# Patient Record
Sex: FEMALE | Race: WHITE | NOT HISPANIC OR LATINO | Employment: UNEMPLOYED | URBAN - METROPOLITAN AREA
[De-identification: names, ages, dates, MRNs, and addresses within clinical notes are randomized per-mention and may not be internally consistent; named-entity substitution may affect disease eponyms.]

---

## 2017-06-29 DIAGNOSIS — O34.219 HISTORY OF CESAREAN DELIVERY AFFECTING PREGNANCY: Primary | ICD-10-CM

## 2017-07-06 ENCOUNTER — OFFICE VISIT (OUTPATIENT)
Dept: MATERNAL FETAL MEDICINE | Facility: CLINIC | Age: 32
End: 2017-07-06
Attending: OBSTETRICS & GYNECOLOGY
Payer: MEDICAID

## 2017-07-06 VITALS
SYSTOLIC BLOOD PRESSURE: 130 MMHG | WEIGHT: 216.06 LBS | BODY MASS INDEX: 39.52 KG/M2 | DIASTOLIC BLOOD PRESSURE: 82 MMHG

## 2017-07-06 DIAGNOSIS — O43.91: ICD-10-CM

## 2017-07-06 DIAGNOSIS — O34.219 HISTORY OF CESAREAN DELIVERY AFFECTING PREGNANCY: ICD-10-CM

## 2017-07-06 PROCEDURE — 76801 OB US < 14 WKS SINGLE FETUS: CPT | Mod: 26,S$PBB,, | Performed by: OBSTETRICS & GYNECOLOGY

## 2017-07-06 PROCEDURE — 76801 OB US < 14 WKS SINGLE FETUS: CPT | Mod: PBBFAC | Performed by: OBSTETRICS & GYNECOLOGY

## 2017-07-06 PROCEDURE — 99213 OFFICE O/P EST LOW 20 MIN: CPT | Mod: S$PBB,25,TH, | Performed by: OBSTETRICS & GYNECOLOGY

## 2017-07-06 PROCEDURE — 99212 OFFICE O/P EST SF 10 MIN: CPT | Mod: PBBFAC | Performed by: OBSTETRICS & GYNECOLOGY

## 2017-07-06 PROCEDURE — 99999 PR PBB SHADOW E&M-EST. PATIENT-LVL II: CPT | Mod: PBBFAC,,, | Performed by: OBSTETRICS & GYNECOLOGY

## 2017-07-06 PROCEDURE — 76817 TRANSVAGINAL US OBSTETRIC: CPT | Mod: PBBFAC | Performed by: OBSTETRICS & GYNECOLOGY

## 2017-07-06 PROCEDURE — 76817 TRANSVAGINAL US OBSTETRIC: CPT | Mod: 26,S$PBB,, | Performed by: OBSTETRICS & GYNECOLOGY

## 2017-07-06 RX ORDER — PROGESTERONE 100 MG/1
100 CAPSULE ORAL 2 TIMES DAILY
Refills: 1 | Status: ON HOLD | COMMUNITY
Start: 2017-06-15 | End: 2017-10-19 | Stop reason: HOSPADM

## 2017-07-06 RX ORDER — PRENATAL WITH FERROUS FUM AND FOLIC ACID 3080; 920; 120; 400; 22; 1.84; 3; 20; 10; 1; 12; 200; 27; 25; 2 [IU]/1; [IU]/1; MG/1; [IU]/1; MG/1; MG/1; MG/1; MG/1; MG/1; MG/1; UG/1; MG/1; MG/1; MG/1; MG/1
1 TABLET ORAL DAILY
COMMUNITY
End: 2018-09-11

## 2017-07-06 NOTE — PROGRESS NOTES
Consulted by Dr. Brown for suspected C/S scar ectopic    32  BANDAR 18; 8w2d    Prenatal record, chart and OB History reviewed  Pt is on oral Prometrium 100 mg QD  Pt interviewed and examined  Ultrasound performed  No interval problems  No leaking, bleeding or discharge    OB HX   - Kipling; 7-11; C/S at term for Breech   - Gray; 8-0; RC/S; superficial wound dehiscence with Staph infection.    Impression  =========  Single viable intrauterine pregnancy consistent with LMP dating.  Embryo grossly WNL with normal cardiac activity.  The placenta appears to be implanted over the C/S scar but the uterus appears intact; I do not anticipate scar separation at this time. I did  discuss the possibility of accreta  Normal uterus, cervix and adnexae as noted above.  Management options discussed with this couple.    Recommendation  ==============  Would discontinue Prometrium  We would like to re-evaluate uterine [place] [facility]; if you agree, please ask your staff to schedule.

## 2017-09-26 DIAGNOSIS — Z36.89 ENCOUNTER FOR ULTRASOUND TO CHECK FETAL GROWTH: Primary | ICD-10-CM

## 2017-10-16 PROBLEM — L02.419 THIGH ABSCESS: Status: ACTIVE | Noted: 2017-10-16

## 2017-10-18 PROBLEM — A49.02 MRSA INFECTION: Status: ACTIVE | Noted: 2017-10-18

## 2017-10-18 PROBLEM — L02.415 ABSCESS OF RIGHT THIGH: Status: ACTIVE | Noted: 2017-10-16

## 2017-10-18 PROBLEM — O43.92 ABNORMAL PLACENTA AFFECTING MANAGEMENT OF MOTHER IN SECOND TRIMESTER: Status: ACTIVE | Noted: 2017-07-06

## 2017-10-19 PROBLEM — A49.02 MRSA INFECTION: Status: ACTIVE | Noted: 2017-10-19

## 2017-10-26 ENCOUNTER — OFFICE VISIT (OUTPATIENT)
Dept: MATERNAL FETAL MEDICINE | Facility: CLINIC | Age: 32
End: 2017-10-26
Payer: COMMERCIAL

## 2017-10-26 VITALS
SYSTOLIC BLOOD PRESSURE: 116 MMHG | DIASTOLIC BLOOD PRESSURE: 74 MMHG | BODY MASS INDEX: 40.85 KG/M2 | WEIGHT: 223.31 LBS

## 2017-10-26 DIAGNOSIS — O43.92 ABNORMAL PLACENTA AFFECTING MANAGEMENT OF MOTHER IN SECOND TRIMESTER: ICD-10-CM

## 2017-10-26 DIAGNOSIS — Z36.89 ENCOUNTER FOR ULTRASOUND TO CHECK FETAL GROWTH: ICD-10-CM

## 2017-10-26 PROCEDURE — 99999 PR PBB SHADOW E&M-EST. PATIENT-LVL II: CPT | Mod: PBBFAC,,, | Performed by: OBSTETRICS & GYNECOLOGY

## 2017-10-26 PROCEDURE — 76816 OB US FOLLOW-UP PER FETUS: CPT | Mod: S$GLB,,, | Performed by: OBSTETRICS & GYNECOLOGY

## 2017-10-26 PROCEDURE — 99214 OFFICE O/P EST MOD 30 MIN: CPT | Mod: 25,S$GLB,, | Performed by: OBSTETRICS & GYNECOLOGY

## 2017-10-26 NOTE — LETTER
October 27, 2017      Ceasar Juarez MD  [address]  [facility] [place] [address]           [facility] - Maternal Fetal Med  [address] [address]  Phone: [phone]          Patient: Parth Latif   MR Number: [id number]   YOB: 1985   Date of Visit: 10/26/2017       Dear Dr. Ceasar Juarez:    Thank you for referring Parth Latif to me for evaluation. Attached you will find relevant portions of my assessment and plan of care.    If you have questions, please do not hesitate to call me. I look forward to following Parth Latif along with you.    Sincerely,    Mike Eric MD    Enclosure  CC:  No Recipients    If you would like to receive this communication electronically, please contact [email or web address] or [phone] to request more information on [employer] Link access.    For providers and/or their staff who would like to refer a patient to [facility], please contact us through our one-stop-shop provider referral line, [facility], at [phone].    If you feel you have received this communication in error or would no longer like to receive these types of communications, please e-mail [email or web address]

## 2017-10-26 NOTE — Clinical Note
This patient is a new morbidly adherent placenta--likely a percreta. Timing will be first week of January. She is getting an MRI in mid-November

## 2017-10-27 DIAGNOSIS — O09.92 HIGH-RISK PREGNANCY IN SECOND TRIMESTER: Primary | ICD-10-CM

## 2017-10-27 NOTE — PROGRESS NOTES
"Indication  ========    Evaluation of fetal growth, Placental implantation.    History  ======    Previous Outcomes  Preg. no. 1  Outcome: Live birth  Preg. no. 2  Outcome: Live birth   3  Para 2  Gomez living children (L) 2  Risk Factors  Details: C/S x 2    Maternal Assessment  =================    Weight 101 kg  Weight (lb) 223 lb  BP syst 116 mmHg  BP diast 74 mmHg    Method  ======    [place]on E10, Transabdominal ultrasound examination. View: Good view.    Pregnancy  =========    Gomez pregnancy. Number of fetuses: 1.    Dating  ======    Cycle: regular cycle  GA by "stated dating" 24 w + 2 d  BANDAR by "stated dating": 2018  Ultrasound examination on: 10/26/2017  GA by U/S based upon: AC, BPD, Femur, HC  GA by U/S 25 w + 5 d  BANDAR by U/S: 2/3/2018  Assigned: Dating performed on 2017, based on the external assessment  Assigned GA 24 w + 2 d  Assigned BANDAR: 2018    General Evaluation  ==============    Cardiac activity: present.  bpm.  Fetal movements: visualized.  Presentation: cephalic.  Placenta: anterior, marginal previa.  Umbilical cord: 3 vessel cord.  Amniotic fluid: Amount of AF: normal amount. MVP 4.8 cm.    Fetal Biometry  ============    Fetal Biometry  BPD 64.2 mm 26w 0d Hadlock  OFD 82.5 mm 26w 6d Rosalinda  .6 mm 25w 4d Hadlock  .7 mm 26w 5d Hadlock  Femur 44.2 mm 24w 4d Hadlock   g 72% Lincoln  Calculated by: Hadlock (BPD-HC-AC-FL)  EFW (lb) 1 lb  EFW (oz) 14 oz  Cephalic index 0.78  HC / AC 1.05  FL / BPD 0.69  FL / AC 0.20  MVP 4.8 cm   bpm    Fetal Anatomy  ===========    Cranium: normal  Posterior fossa: normal  4-chamber view: normal  Stomach: normal  Kidneys: normal  Bladder: normal  Gender: male  Wants to know gender: yes  Other: A full anatomy survey previously performed.    Consultation  ==========      Prenatal record, chart and OB History reviewed    Dr. Londono's last note reviewed  Pt interviewed and examined  Ultrasound " performed  No interval problems  No leaking, bleeding or discharge  Good MACIE Alba was seen in follow-up today. She was accompanied by her  and her mother as well as her son. She denies any significant  problems with the pregnancy since her last visit. She was however recently admitted to the hospital for a thigh abscess which was due to  MRSA. She has a history of recurrent MRSA infections. Her prior  delivery was complicated by wound dehiscence with a staph  infection. She specifically denies any bleeding or hematuria. She has not had any problems with bowel movements and has not noticed any  blood in her stool.    An ultrasound evaluation was performed today. Fetal growth is normal as is the amniotic fluid volume. We focused mainly on the placenta.  There is a complete placenta previa with an anterior placenta. In the area of the old hysterotomy there appears to be a loss of the normal  interface between the placenta and the endometrium. There is an abnormal contour to the uterus in this area and there are multiple placental  lakes. In the area of the vesicouterine reflection there is marked vascularity which appears to be maternal in nature. There does not appear to  be any obvious invasion of the bladder although the posterior wall of the bladder is thin and it looks like the vessels in this area push on it. With  ultrasound I do not see any obvious parametrial involvement but it is difficult to image this area well.    I overall spent approximately 35 minutes in face-to-face time with her and her family greater than 50% of which was spent in counseling time  regarding today's ultrasound findings and in discussion of the plan of care. Based on the imaging I believe that this is a morbidly adherent  placenta and is most likely consistent with at least an increta and most likely is a percreta. We are going to obtain further imaging with a fetal  MRI to better determine the extent of invasion  through the uterine wall and to determine if there is invasion into the bladder. This will also help  us better determine if there is extensive parametrial involvement. I explained to her our usual approach to the morbidly adherent placenta  especially those involving the bladder. This includes involvement of our multidisciplinary placenta accreta teen with maternal-fetal medicine,  GYN oncology, OB anesthesia, neonatology, and interventional radiology. We also involve our colleagues from blood bank in preparation for  potential large-volume blood transfusion. I explained that we typically plan on delivery at around 34 weeks with a  delivery via a vertical  midline incision with the placenta left in situ. Assuming that there is no significant bleeding from the placental bed or uterus, we then proceed  with uterine artery embolization and plan for interval hysterectomy after there is been a decrease in vascularity. We would monitor labs as an  outpatient to look for any evidence of coagulopathy and we would also need to be mindful of the potential increase in the risk of infection.    We are going to get the imaging and follow-up consults scheduled for approximately 3-4 weeks and we'll try to coordinate these on the same  day. I will plan to see her back at that time as well. I reviewed bleeding precautions with Parth and explained that if she has any hematuria or  bleeding she should seek prompt evaluation.    Impression  =========    Normal fetal growth at the 72nd percentile  Normal amniotic fluid volume  Complete placenta previa with morbidly adherent placenta likely a percreta into the uterine serosa.  Marked vascularity in the area of vesicouterine reflection that represents maternal vascular plexus.    Recommendation  ==============    1. Pelvic MRI to be obtained to better define depth of invasion and extent of parametrial involvement  2. Consultation to be arranged with accreta team: Dr. Bowens from  Gyn Oncology, OB anesthesia, and Interventional radiology  3. Bleeding precautions reviewed but in absence of indication for earlier delivery, will plan delivery at 34-35 weeks ( 2-8) in main OR at  [facility] with plan for classical , followed by uterine artery embolization and interval hysterectomy.    Thank you again for allowing us to participate in the care of your patients. If you have any questions concerning today's consultation, feel free  to contact me or one of my partners. We can be reached at [phone] during normal business hours. If you have a question after normal  business hours, please contact Labor and Delivery at [phone].

## 2017-10-30 ENCOUNTER — TELEPHONE (OUTPATIENT)
Dept: MATERNAL FETAL MEDICINE | Facility: CLINIC | Age: 32
End: 2017-10-30

## 2017-10-30 DIAGNOSIS — Z36.89 ENCOUNTER FOR ULTRASOUND TO CHECK FETAL GROWTH: Primary | ICD-10-CM

## 2017-10-30 DIAGNOSIS — O43.93 ABNORMAL PLACENTA AFFECTING MANAGEMENT OF MOTHER IN THIRD TRIMESTER: ICD-10-CM

## 2017-11-20 ENCOUNTER — OFFICE VISIT (OUTPATIENT)
Dept: GYNECOLOGIC ONCOLOGY | Facility: CLINIC | Age: 32
End: 2017-11-20
Payer: COMMERCIAL

## 2017-11-20 ENCOUNTER — ANESTHESIA EVENT (OUTPATIENT)
Dept: ANESTHESIOLOGY | Facility: OTHER | Age: 32
End: 2017-11-20
Payer: COMMERCIAL

## 2017-11-20 ENCOUNTER — OFFICE VISIT (OUTPATIENT)
Dept: MATERNAL FETAL MEDICINE | Facility: CLINIC | Age: 32
End: 2017-11-20
Payer: COMMERCIAL

## 2017-11-20 ENCOUNTER — ANESTHESIA (OUTPATIENT)
Dept: ANESTHESIOLOGY | Facility: OTHER | Age: 32
End: 2017-11-20
Payer: COMMERCIAL

## 2017-11-20 ENCOUNTER — OFFICE VISIT (OUTPATIENT)
Dept: ANESTHESIOLOGY | Facility: OTHER | Age: 32
End: 2017-11-20
Attending: OBSTETRICS & GYNECOLOGY
Payer: COMMERCIAL

## 2017-11-20 ENCOUNTER — HOSPITAL ENCOUNTER (OUTPATIENT)
Dept: RADIOLOGY | Facility: OTHER | Age: 32
Discharge: HOME | End: 2017-11-20
Attending: OBSTETRICS & GYNECOLOGY
Payer: COMMERCIAL

## 2017-11-20 VITALS
WEIGHT: 223 LBS | DIASTOLIC BLOOD PRESSURE: 61 MMHG | BODY MASS INDEX: 40.79 KG/M2 | HEART RATE: 95 BPM | SYSTOLIC BLOOD PRESSURE: 126 MMHG

## 2017-11-20 VITALS
BODY MASS INDEX: 40.81 KG/M2 | WEIGHT: 223.13 LBS | DIASTOLIC BLOOD PRESSURE: 61 MMHG | SYSTOLIC BLOOD PRESSURE: 126 MMHG

## 2017-11-20 DIAGNOSIS — O43.93 ABNORMAL PLACENTA AFFECTING MANAGEMENT OF MOTHER IN THIRD TRIMESTER: ICD-10-CM

## 2017-11-20 DIAGNOSIS — Z36.89 ENCOUNTER FOR ULTRASOUND TO CHECK FETAL GROWTH: ICD-10-CM

## 2017-11-20 DIAGNOSIS — O44.03 PLACENTA PREVIA ANTEPARTUM IN THIRD TRIMESTER: Primary | ICD-10-CM

## 2017-11-20 DIAGNOSIS — O43.212 PLACENTA ACCRETA IN SECOND TRIMESTER: ICD-10-CM

## 2017-11-20 DIAGNOSIS — O43.92 ABNORMAL PLACENTA AFFECTING MANAGEMENT OF MOTHER IN SECOND TRIMESTER: Primary | ICD-10-CM

## 2017-11-20 DIAGNOSIS — O43.213 PLACENTA ACCRETA, THIRD TRIMESTER: ICD-10-CM

## 2017-11-20 PROCEDURE — 72195 MRI PELVIS W/O DYE: CPT | Mod: 26,,, | Performed by: RADIOLOGY

## 2017-11-20 PROCEDURE — 99204 OFFICE O/P NEW MOD 45 MIN: CPT | Mod: ICN,,, | Performed by: ANESTHESIOLOGY

## 2017-11-20 PROCEDURE — 99245 OFF/OP CONSLTJ NEW/EST HI 55: CPT | Mod: S$GLB,,, | Performed by: OBSTETRICS & GYNECOLOGY

## 2017-11-20 PROCEDURE — 99999 PR PBB SHADOW E&M-EST. PATIENT-LVL II: CPT | Mod: PBBFAC,,, | Performed by: OBSTETRICS & GYNECOLOGY

## 2017-11-20 PROCEDURE — 99214 OFFICE O/P EST MOD 30 MIN: CPT | Mod: 25,S$GLB,, | Performed by: OBSTETRICS & GYNECOLOGY

## 2017-11-20 PROCEDURE — 76816 OB US FOLLOW-UP PER FETUS: CPT | Mod: S$GLB,,, | Performed by: OBSTETRICS & GYNECOLOGY

## 2017-11-20 PROCEDURE — 72195 MRI PELVIS W/O DYE: CPT | Mod: TC

## 2017-11-20 NOTE — CONSULTS
Consults   [facility]    Date:    2017  12:40 PM     Anesthesia Consult: outpatient    Initial Consultation: Yes    Requested by: Dr. Eric    Diagnosis:  Placenta Previa and Percreta    Reason for Consult: Anesthetic recommendations for delivery    Allergies:  Pcn [penicillins]    History of Present Illness:    Patient is a 32 year old,  female, . with history of 2 prior  deliveries diagnosed with placenta previa and percreta.  Patient also has a history of mandibular surgery for retrognathia.       Past medical history:    Past Medical History:   Diagnosis Date    MRSA (methicillin resistant staph aureus) culture positive        Past surgical history:    Past Surgical History:   Procedure Laterality Date     SECTION      x2     MANDIBLE SURGERY      TONSILLECTOMY         Family history:    Family History   Problem Relation Age of Onset    Hypertension Mother     No Known Problems Father        Social History:    Social History     Social History    Marital status:      Spouse name: N/A    Number of children: N/A    Years of education: N/A     Occupational History    Not on file.     Social History Main Topics    Smoking status: Never Smoker    Smokeless tobacco: Never Used    Alcohol use No    Drug use: No    Sexual activity: Yes     Partners: Male     Birth control/ protection: IUD     Other Topics Concern    Not on file     Social History Narrative    No narrative on file     Medication:    Current Outpatient Prescriptions on File Prior to Visit   Medication Sig Dispense Refill    butalbital-acetaminophen-caff -40 mg Cap       clindamycin (CLEOCIN) 300 MG capsule Take 1 capsule (300 mg total) by mouth every 6 (six) hours. 21 capsule 0    PNV,CALCIUM 72/IRON/FOLIC ACID (PRENATAL VITAMIN) Tab Take 1 tablet by mouth once daily.       No current facility-administered medications on file prior to visit.          Past anesthesia  history:    Hx of general anesthesia problems: No  No anesthesia complications after general anesthesia.   Nausea with spinal anesthetics for  deliveries    Diagnostic Studies    I have reviewed the following. Relevant findings as noted:    Blood group: B POS   CBC:   Lab Results   Component Value Date    WBC 15.60 (H) 10/16/2017    RBC 3.86 (L) 10/16/2017    HGB 11.1 (L) 10/16/2017    HCT 34.2 (L) 10/16/2017     10/16/2017     BMP:   Lab Results   Component Value Date     (H) 10/16/2017     10/16/2017    K 3.9 10/16/2017     10/16/2017    CO2 24 10/16/2017    BUN 7 10/16/2017    CREATININE 0.60 (L) 10/19/2017    CALCIUM 9.2 10/16/2017     MRI Results pending    Review of Systems     Constitution: no weight loss, fever, night sweats  Eyes:  no recent visual changes  ENT:  None  Respiratory:  Shortness of Breath with activity (as expected with pregnancy)  Cardiovascular: negative chest pain  Hematology: negative for easy bleeding, bruising  Gastrointestinal:  Negative for abdominal pain  Genitourinary:  Negative for blood in urine  Musculoskeletal:  Negative for musculoskeletal pain  Neurologic:  None  Psych:No depression  Endocrine:  None      Physical Examination:     General appearance: healthy, no distress, cooperativewell developed, well nourished female  Eye: PERRL, EOM normal  ENT: ENT exam normal, no neck nodes or sinus tenderness and airway not compromised  Pulm: no accessory muscle use  Cardiac: regular rate and rhythm  Neuro: normal without focal findings, mental status, speech normal, alert and oriented x3, ELLIOT and reflexes normal and symmetric  Musculoskeletal: no joint tenderness, deformity or swelling, no muscular tenderness noted, full range of motion without pain  Skin: negative for - jaundice, spider hemangioma, telangiectasia, palmar erythema, ecchymosis and atrophy  Lymphatic: No abnormally enlarged lymph nodes.  Psych: oriented to time, place and  person  Airway: negative I (soft palate, uvula, fauces, and tonsillar pillars visible) adequate mouth opening      Problem Assessment    ASA 3 - Patient with moderate systemic disease with functional limitations    History of present disease is positive for:  Placenta previa and percreta.      Plans & Recommendations    Discussed possibility of neuraxial and general anesthetic for delivery.  Neuraxial vs GETA will be determined after reviewing imaging and discussion with MFM and gyn/onc. Do not anticipate difficulty with airway for GETA after mandibular surgery (adequate mouth opening, MPI)  Will provide anxiolytic if neuraxial anesthesia provided.  Once adequately anesthetized, large bore IV Access will be obtained.  Patient has a history of difficult IV stick after requiring multiple IVs for vancomycin for recent MRSA treatment.  Discussed both peripheral and central IV access. Will plan for arterial line for close hemodynamic monitoring and laboratory testing intraoperatively.  Depending upon anesthetic type, extent of case, requirement for blood products, patient may require ICU admission.    Entertained and answered all question to the patient's and family's satisfaction.       Dafne Joseph MD

## 2017-11-20 NOTE — LETTER
November 21, 2017      Leslee Brown MD  [address] [address]           [facility] - Maternal Fetal Med  [address] [address]  Phone: [phone]          Patient: Parth Latif   MR Number: [id number]   YOB: 1985   Date of Visit: 11/20/2017       Dear Dr. Leslee Brown:    Thank you for referring Parth Latif to me for evaluation. Attached you will find relevant portions of my assessment and plan of care.    If you have questions, please do not hesitate to call me. I look forward to following Parth Latif along with you.    Sincerely,    Mike Eric MD    Enclosure  CC:  No Recipients    If you would like to receive this communication electronically, please contact [email or web address] or [phone] to request more information on [email or web address] Link access.    For providers and/or their staff who would like to refer a patient to [facility], please contact us through our one-stop-shop provider referral line, [facility], at [phone].    If you feel you have received this communication in error or would no longer like to receive these types of communications, please e-mail [email or web address]

## 2017-11-20 NOTE — LETTER
November 20, 2017      Mike Eric MD  [address]  [address]th [address]  [place] [address]           [facility] - Gynecologic Oncology  [address], [address]  [place] [address]  Phone: [phone]  Fax: [phone]          Patient: Parth Latif   MR Number: [id number]   YOB: 1985   Date of Visit: 11/20/2017       Dear Dr. Mike Eric:    Thank you for referring Parth Latif to me for evaluation. Attached you will find relevant portions of my assessment and plan of care.    If you have questions, please do not hesitate to call me. I look forward to following Parth Latif along with you.    Sincerely,    Lamont Bowens MD    Enclosure  CC:  No Recipients    If you would like to receive this communication electronically, please contact [email or web address] or [phone] to request more information on [employer] Link access.    For providers and/or their staff who would like to refer a patient to [facility], please contact us through our one-stop-shop provider referral line, [facility], at [phone].    If you feel you have received this communication in error or would no longer like to receive these types of communications, please e-mail [email or web address]

## 2017-11-20 NOTE — PROGRESS NOTES
"Subjective:      Patient ID: Parth Latif is a 32 y.o. female.    Chief Complaint: Percreta (Referred by Dr. Eric)      HPI     Ms. Latif is a 32yr old para 2 who is currently pregnant at 27w 6d (BANDAR 2/13/2018) referred by Dr. Eric due to placenta percreta. Pt reports two prior c section (LTV, '10 and '13). MRSA wound dehiscence with last c section.    MFM 10/26/2017  "complete placenta previa with an anterior placenta. In the area of the old hysterotomy there appears to be a loss of the normal  interface between the placenta and the endometrium. There is an abnormal contour to the uterus in this area and there are multiple placental  lakes. In the area of the vesicouterine reflection there is marked vascularity which appears to be maternal in nature. There does not appear to  be any obvious invasion of the bladder although the posterior wall of the bladder is thin and it looks like the vessels in this area push on it. With  ultrasound I do not see any obvious parametrial involvement but it is difficult to image this area well"    MRI today 11/20/2017 results pending.  She is also scheduled to meet with anesthesia.  She is here today to establish care and to outline our surgical plan.    Review of Systems   Constitutional: Negative for chills, diaphoresis, fatigue and fever.   Respiratory: Negative for chest tightness, shortness of breath and wheezing.    Cardiovascular: Negative for chest pain, palpitations and leg swelling.   Gastrointestinal: Negative for abdominal pain, constipation, diarrhea, nausea and vomiting.   Genitourinary: Negative for difficulty urinating, dyspareunia, dysuria, flank pain, frequency, genital sores, hematuria, pelvic pain, urgency, vaginal bleeding, vaginal discharge and vaginal pain.   Skin: Negative for color change.   Neurological: Negative for dizziness, light-headedness and headaches.   Psychiatric/Behavioral: Negative for agitation.       Past Medical History: "   Diagnosis Date    MRSA (methicillin resistant staph aureus) culture positive      Past Surgical History:   Procedure Laterality Date     SECTION      x2     MANDIBLE SURGERY      TONSILLECTOMY       Family History   Problem Relation Age of Onset    Hypertension Mother     No Known Problems Father      Social History     Social History    Marital status:      Spouse name: N/A    Number of children: N/A    Years of education: N/A     Occupational History    Not on file.     Social History Main Topics    Smoking status: Never Smoker    Smokeless tobacco: Never Used    Alcohol use No    Drug use: No    Sexual activity: Yes     Partners: Male     Birth control/ protection: IUD     Other Topics Concern    Not on file     Social History Narrative    No narrative on file     Current Outpatient Prescriptions   Medication Sig    butalbital-acetaminophen-caff -40 mg Cap     clindamycin (CLEOCIN) 300 MG capsule Take 1 capsule (300 mg total) by mouth every 6 (six) hours.    PNV,CALCIUM 72/IRON/FOLIC ACID (PRENATAL VITAMIN) Tab Take 1 tablet by mouth once daily.     No current facility-administered medications for this visit.      Review of patient's allergies indicates:   Allergen Reactions    Pcn [penicillins] Hives       Objective:   Physical Exam:   Constitutional: She is oriented to person, place, and time. She appears well-developed and well-nourished. No distress.    HENT:   Head: Normocephalic and atraumatic.     Neck: Normal range of motion.    Cardiovascular: Exam reveals no cyanosis and no edema.     Pulmonary/Chest: Effort normal. No respiratory distress. She exhibits no tenderness.        Abdominal: She exhibits mass (gravid uterus extending above umbilicus). She exhibits no fluid wave and no ascites.             Musculoskeletal: Normal range of motion and moves all extremeties.       Neurological: She is alert and oriented to person, place, and time.    Skin: Skin is warm  and dry. She is not diaphoretic. No cyanosis.    Psychiatric: She has a normal mood and affect.       Assessment:     1. Abnormal placenta affecting management of mother in second trimester        Plan:       Will f/u results of MRI.  I am ok to operate on her when [facility] deems appropriate, which would be 1st week of January if at 34 weeks.  We spent time today outlining the challenges associated with abnormal placentation like hers, and the risks of bleeding and other surgical morbidity related to hysterectomy for her diagnosis.  She is aware that we will attempt to keep the placenta in situ with immediate post-op embolization, and then perform interval hysterectomy 4 weeks later.  She was also counseled on the possibility of urologic resection and reconstruction, transfusion, ICU stay, and prolonged viera catheter drainage of her bladder.  All questions were answered.  I will have her return to see me closer to deliver so she can sign consents and we can finalize her treatment plan.

## 2017-11-21 NOTE — PROGRESS NOTES
"Indication  ========    Follow-up evaluation for fetal growth \, placenta previa ? accreta.    History  ======    General History  Other: Paulina Calderon  Previous Outcomes  Preg. no. 1  Outcome: Live birth  Preg. no. 2  Outcome: Live birth   3  Para 2  Gomez living children (L) 2  Risk Factors  Details: C/S x 2    Method  ======    Transabdominal ultrasound examination. View: Sufficient.    Pregnancy  =========    Gomez pregnancy. Number of fetuses: 1.    Dating  ======    Cycle: regular cycle  GA by "stated dating" 27 w + 6 d  BANDAR by "stated dating": 2018  Ultrasound examination on: 2017  GA by U/S based upon: AC, BPD, Femur, HC  GA by U/S 29 w + 4 d  BANDAR by U/S: 2018  Assigned: Dating performed on 2017, based on the external assessment  Assigned GA 27 w + 6 d  Assigned BANDAR: 2018    General Evaluation  ==============    Cardiac activity: present.  bpm.  Fetal movements: visualized.  Presentation: transverse/cephalic.  Placenta:  Placental site: complete previa.  Umbilical cord: Cord vessels: 3 vessel cord.  Amniotic fluid: MVP 6.1 cm. SHE 17.2 cm. Q1 4.6 cm, Q2 6.1 cm, Q3 4.6 cm, Q4 2.0 cm.  There continue to be large placental lakes with irregular interface between the placenta and uterus suggestive of placenta accreta/increta. No  obvious bladder involvement.    Fetal Biometry  ============    Fetal Biometry  BPD 75.1 mm 30w 1d Hadlock  OFD 94.2 mm 30w 3d Rosalinda  .6 mm 30w 0d Hadlock  .7 mm 30w 4d Hadlock  Femur 51.5 mm 27w 4d Hadlock  EFW 1,409 g 66% Lincoln  Calculated by: Hadlock (BPD-HC-AC-FL)  EFW (lb) 3 lb  EFW (oz) 2 oz  Cephalic index 0.80  HC / AC 1.04  FL / BPD 0.69  FL / AC 0.19  MVP 6.1 cm  SHE 17.2 cm   bpm  Head / Face / Neck   6.3 mm    Fetal Anatomy  ============    Cranium: normal  Lateral ventricles: normal  Cavum septi pellucidi: normal  Cerebellum: normal  Cisterna " magna: normal  Lips: normal  Profile: normal  Nose: normal  4-chamber view: normal  Stomach: normal  Kidneys: normal  Bladder: normal  Genitals: normal  Gender: male  Wants to know gender: yes    Consultation  ==========    Parth was seen in follow-up today.  Prior notes reviewed.  She denies any significant interval problems other than the fact that she recently failed her 1 hour glucose screening. She has not had any  bleeding, cramping, or contractions. She has not noted any blood in her urine. She reports good fetal movement.    She had an MRI exam this morning. The final report of the MRI is pending at this time. My informal review of the MRI does not show any clear  area of bladder invasion although there is marked vascularity in the area. I do not see any obvious parametrial extension at this point on the  imaging either.    She met with Dr. Bowens in GYN oncology as well as Dr. Joseph from OB anesthesia this morning.    Fetal growth is at the 66th percentile. The amniotic fluid volume is normal. We continue to see that the placenta is irregular in appearance with  a loss of the normal interface at the inferior portion in the area of the old hysterotomy. Multiple placental lakes are noted as well.    I discussed with her and her mother again the plan of care and explained that we will plan on delivery at approximately 34 weeks of gestation  which will be the first week of January. We will want her to have a course of  corticosteroids prior to that and this can likely be  arranged as an outpatient. We again addressed the surgical approach with the hope to leave the placenta in situ followed by embolization with  delayed hysterectomy at 4 weeks to 6 weeks postpartum. We discussed some of the risks of surgery including bleeding, infection, damage to  surrounding tissues, the potential need for bladder dissection and resection, transfusion, and ICU stay.    I explained that she will need to come in a  couple of days prior to surgery to have labs drawn and at that point we will also arrange for her to  meet with our interventional radiology team so that they can discuss the embolization procedure with her. Furthermore given her history of  MRSA we will need to alter our typical antibiotic prophylaxis.    I overall spent approximately 30 minutes in face-to-face time with her and her mother greater than 50% of which were spent in counseling time  and further care coordination. I reviewed precautions with Parth again and we have scheduled a follow-up appointment for approximately 3-4  weeks from now and at that time we will make final plans regarding delivery.    Impression  =========    Fetal size is AGA with the EFW at the 66th percentile.  Normal repeat limited fetal anatomic survey. AFV is normal.  Complete placenta previa with multiple vascular lakes and abnormal interface with myometrium highly suggestive of placenta accreta; no  obvious bladder invasion.    Recommendation  ==============    We recommend repeat evaluation for fetal growth assessment in 3-4 weeks.  Delivery planned for 34 weeks with  corticosteroids the week prior. If any problems arise in the interim in terms of labor, bleeding,  hematuria, may need earlier delivery.    Thank you again for allowing us to participate in the care of your patients. If you have any questions concerning today's consultation, feel free  to contact me or one of my partners. We can be reached at [phone] during normal business hours. If you have a question after normal  business hours, please contact Labor and Delivery at [phone].

## 2017-12-22 ENCOUNTER — OFFICE VISIT (OUTPATIENT)
Dept: MATERNAL FETAL MEDICINE | Facility: CLINIC | Age: 32
End: 2017-12-22
Payer: COMMERCIAL

## 2017-12-22 VITALS
SYSTOLIC BLOOD PRESSURE: 114 MMHG | BODY MASS INDEX: 42.66 KG/M2 | WEIGHT: 233.25 LBS | DIASTOLIC BLOOD PRESSURE: 68 MMHG

## 2017-12-22 DIAGNOSIS — Z36.89 ENCOUNTER FOR ULTRASOUND TO CHECK FETAL GROWTH: ICD-10-CM

## 2017-12-22 DIAGNOSIS — O43.213 PLACENTA ACCRETA, THIRD TRIMESTER: Primary | ICD-10-CM

## 2017-12-22 PROCEDURE — 99214 OFFICE O/P EST MOD 30 MIN: CPT | Mod: 25,S$GLB,, | Performed by: OBSTETRICS & GYNECOLOGY

## 2017-12-22 PROCEDURE — 76819 FETAL BIOPHYS PROFIL W/O NST: CPT | Mod: S$GLB,,, | Performed by: OBSTETRICS & GYNECOLOGY

## 2017-12-22 PROCEDURE — 76816 OB US FOLLOW-UP PER FETUS: CPT | Mod: S$GLB,,, | Performed by: OBSTETRICS & GYNECOLOGY

## 2017-12-22 PROCEDURE — 99999 PR PBB SHADOW E&M-EST. PATIENT-LVL II: CPT | Mod: PBBFAC,,, | Performed by: OBSTETRICS & GYNECOLOGY

## 2017-12-22 NOTE — LETTER
December 22, 2017      Leslee Brown MD  [address] [address]           [facility]  [address] [address]  Phone: [phone]          Patient: Parth Latif   MR Number: [id number]   YOB: 1985   Date of Visit: 12/22/2017       Dear Dr. Leslee Brown:    Thank you for referring Parth Latif to me for evaluation. Attached you will find relevant portions of my assessment and plan of care.    If you have questions, please do not hesitate to call me. I look forward to following Parth Latif along with you.    Sincerely,    Mike Eric MD    Enclosure  CC:  No Recipients    If you would like to receive this communication electronically, please contact [email or web address] or [phone] to request more information on [employer] access.    For providers and/or their staff who would like to refer a patient to [facility], please contact us through our one-stop-shop provider referral line, [facility], at [phone].    If you feel you have received this communication in error or would no longer like to receive these types of communications, please e-mail [email or web address]

## 2017-12-22 NOTE — PROGRESS NOTES
"Indication  ========    r/t md: Evaluation of fetal growth/ complete previa/ accreta.    History  ======    General History  Other: Paulina Calderon  Previous Outcomes  Preg. no. 1  Outcome: Live birth  Details:   Preg. no. 2  Outcome: Live birth  Details:    3  Para 2  Gomez living children (L) 2  Risk Factors  Details: C/S x 2    Maternal Assessment  =================    Weight 106 kg  Weight (lb) 234 lb  BP syst 114 mmHg  BP diast 68 mmHg    Method  ======    Transabdominal ultrasound examination. View: Sufficient.    Pregnancy  =========    Gomez pregnancy. Number of fetuses: 1.    Dating  ======    Cycle: regular cycle  GA by "stated dating" 32 w + 3 d  BANDAR by "stated dating": 2018  Ultrasound examination on: 2017  GA by U/S based upon: AC, BPD, Femur, HC  GA by U/S 35 w + 5 d  BANDAR by U/S: 2018  Assigned: Dating performed on 2017, based on the external assessment  Assigned GA 32 w + 3 d  Assigned BANDAR: 2018    General Evaluation  ==============    Cardiac activity: present.  bpm.  Fetal movements: visualized.  Presentation: transverse.  Placenta: Complete previa.  Umbilical cord: 3 vessel cord.  Amniotic fluid: Amount of AF: normal amount. MVP 9.7 cm. SHE 18.5 cm. Q1 8.5 cm, Q2 3.2 cm, Q3 4.3 cm, Q4 2.5 cm.    Fetal Biometry  ============    Fetal Biometry  BPD 91.3 mm 37w 0d Hadlock  .0 mm 38w 4d Rosalinda  .2 mm 37w 0d Hadlock  .6 mm 37w 3d Hadlock  Femur 60.6 mm 31w 4d Hadlock  EFW 2,803 g 74% Lincoln  Calculated by: Hadlock (BPD-HC-AC-FL)  EFW (lb) 6 lb  EFW (oz) 3 oz  Cephalic index 0.81  HC / AC 0.97  FL / BPD 0.66  FL / AC 0.18  MVP 9.7 cm  SHE 18.5 cm   bpm  Head / Face / Neck   8.1 mm    Fetal Anatomy  ===========    Cranium: normal  Lateral ventricles: normal  Posterior fossa: normal  Stomach: normal  Kidneys: normal  Bladder: normal  Gender: male  Wants to know gender: yes  Other: A full anatomy survey " "previously performed.    Consultation  ==========    Parth was seen in follow-up today.    She denies any significant interval problems. She has not had any bleeding, cramping, or contractions. She has not noted any blood in her  urine. She reports good fetal movement.    She had an MRI last month that did not show definite bladder invasion although there is marked vascularity in the area and a high degree of  suspicion. There was no parametrial extension.  MRI read was: "Complete placenta previa with suggestion of pathologic placental bands along the right anterolateral placental margin raising  concern for placenta accreta. Thinning and irregularity of the adjacent superior bladder wall is concerning for possible percreta with bladder wall  invasion."    Fetal growth is at the 74th percentile. The amniotic fluid volume is normal. We continue to see that the placenta is irregular in appearance  with  a loss of the normal interface at the inferior portion in the area of the old hysterotomy. Multiple placental lakes are noted as well. There is no  obvious bladder invasion. BPP is 8/8.    We are planning on delivery at approximately 34 weeks of gestation in the first week of January. We will want her to have a course of   corticosteroids prior to that and will get this arranged as an outpatient. We again addressed the surgical approach with the hope to leave the  placenta in situ followed by embolization with  delayed hysterectomy at 4 weeks to 6 weeks postpartum. We discussed some of the risks of surgery including bleeding, infection, damage  to  surrounding tissues, the potential need for bladder dissection and resection, transfusion, and ICU stay. We also discussed that we typically  discourage breastfeeding to avoid oxytocin release, but if she feels strongly we could allow her to try to breastfeed with a plan to discontinue it  if she has significant contractions.    I explained that she will need to " come in as an outpatient a couple of days prior to surgery to have labs drawn and at that point we will also  arrange for her to  meet with our interventional radiology team so that they can discuss the embolization procedure with her. Furthermore given her history of  MRSA we will need to alter our typical antibiotic prophylaxis and will also plan on pre-op mupiricin.    I overall spent approximately 30 minutes in face-to-face time with her and her  greater than 50% of which were spent in counseling  time  and further care coordination. I reviewed precautions with Parth again and will get in touch with her as soon as we make final plans regarding  delivery.      Impression  =========    Fetal size is AGA with the EFW at the 74th percentile.  Normal repeat limited fetal anatomic survey. AFV is normal.  Complete placenta previa with multiple vascular lakes and abnormal interface with myometrium highly suggestive of placenta accreta; no  obvious bladder invasion but percreta suspected.    Recommendation  ==============    Delivery planned for 34 weeks with  corticosteroids the week prior. If any problems arise in the interim in terms of labor, bleeding,  hematuria, please call and we can accept her in transfer.    Thank you again for allowing us to participate in the care of your patients. If you have any questions concerning today's consultation, feel free  to contact me or one of my partners. We can be reached at [phone] during normal business hours. If you have a question after normal  business hours, please contact Labor and Delivery at [phone].

## 2017-12-27 ENCOUNTER — TELEPHONE (OUTPATIENT)
Dept: MATERNAL FETAL MEDICINE | Facility: CLINIC | Age: 32
End: 2017-12-27

## 2017-12-27 ENCOUNTER — TELEPHONE (OUTPATIENT)
Dept: GYNECOLOGIC ONCOLOGY | Facility: CLINIC | Age: 32
End: 2017-12-27

## 2017-12-27 DIAGNOSIS — O43.213 PLACENTA ACCRETA, THIRD TRIMESTER: Primary | ICD-10-CM

## 2017-12-27 PROBLEM — Z34.90 PREGNANCY: Status: ACTIVE | Noted: 2017-12-27

## 2017-12-27 NOTE — TELEPHONE ENCOUNTER
----- Message from Kathy Timi sent at 12/27/2017  8:35 AM CST -----  Contact: self  Pt MRN [id number] Janee Latifessence called to speak with Dr Eric's nurse regarding her C Section date and when she should get her steroid shots??? Pt can be reached at [phone].    Arranging several visits for patient and will confirm surgery date. Spoke with Dr. Brown's office and she will give orders for BMZ series to be given today and tomorrow on L&D at . Once surgery date scheduled, pt will be contacted again regarding lab appointment and interventional radiology consult.    Pt verbalized understanding of information.

## 2017-12-29 ENCOUNTER — TELEPHONE (OUTPATIENT)
Dept: MATERNAL FETAL MEDICINE | Facility: CLINIC | Age: 32
End: 2017-12-29

## 2017-12-29 ENCOUNTER — ANESTHESIA EVENT (OUTPATIENT)
Dept: OBSTETRICS AND GYNECOLOGY | Facility: OTHER | Age: 32
End: 2017-12-29
Payer: COMMERCIAL

## 2017-12-29 NOTE — TELEPHONE ENCOUNTER
Patient calling to confirm scheduled surgery date and pre-surgery appointments for next week.    Nurse confirmed that after speaking with Dr. Eric and Dr. Bowens' staff patient is scheduled for her surgery/delivery on Thursday 01/04/2018 at 9:00 am.     Patient will meet with Interventional Radiology the day before (01/03/2018). Nurse let patient know that Dr. Lobato would like her to be seen at either 1:00 pm or 3:30 pm to review his aspect of the surgery and sign consents. Nurse spoke with patient that she would also schedule her to have blood work drawn that same day, per Dr. Eric's request:    - CBC  - BMP  - PT/INR  - PTT  - Type and Screen for 4 units     Nurse reiterated to patient that she thinks she would need to [facility] the [facility] around 7:00 am on 01/04/2018 and to remain NPO beginning at midnight. Nurse let patient know that should would confirm all of this again on Tuesday 01/02/2018 and either myself or Bruce would call her to confirm.

## 2018-01-02 ENCOUNTER — TELEPHONE (OUTPATIENT)
Dept: MATERNAL FETAL MEDICINE | Facility: CLINIC | Age: 33
End: 2018-01-02

## 2018-01-02 DIAGNOSIS — O43.93 ABNORMAL PLACENTA AFFECTING MANAGEMENT OF MOTHER IN THIRD TRIMESTER: Primary | ICD-10-CM

## 2018-01-02 NOTE — TELEPHONE ENCOUNTER
After discussion with JANNETTE Rivera RN - L&D Charge, patient instructed to report to [facility] L&D (6th Floor) on Thursday 01/04/2018 at 7:00 am prior to surgery.    Patient to have nothing to eat or drink by mouth starting at midnight.    Patient verbalized instructions of information received.

## 2018-01-02 NOTE — TELEPHONE ENCOUNTER
After speaking with JUVENAL Smith from Cardiology Cath Services, patient scheduled for Wednesday 01/03/2018 at 1:00 pm to meet with Interventional Radiology.     Patient also scheduled for labs per Dr. Eric: (Wednesday 01/03/2018 at 12:30 pm)     - CBC  - BMP  - PT/INR  - PTT  - Type & Screen (Hold 4 units for surgery)    Patient notified and aware of appointment dates and times. Patient verbalized understanding of information received.

## 2018-01-03 ENCOUNTER — INITIAL CONSULT (OUTPATIENT)
Dept: INTERVENTIONAL RADIOLOGY/VASCULAR | Facility: CLINIC | Age: 33
End: 2018-01-03
Payer: COMMERCIAL

## 2018-01-03 VITALS
WEIGHT: 234 LBS | DIASTOLIC BLOOD PRESSURE: 75 MMHG | SYSTOLIC BLOOD PRESSURE: 120 MMHG | HEART RATE: 114 BPM | BODY MASS INDEX: 43.06 KG/M2 | HEIGHT: 62 IN

## 2018-01-03 DIAGNOSIS — O43.92 ABNORMAL PLACENTA AFFECTING MANAGEMENT OF MOTHER IN SECOND TRIMESTER: Primary | ICD-10-CM

## 2018-01-03 PROCEDURE — 99201 PR OFFICE/OUTPT VISIT,NEW,LEVL I: CPT | Mod: S$GLB,,, | Performed by: RADIOLOGY

## 2018-01-03 PROCEDURE — 99999 PR PBB SHADOW E&M-EST. PATIENT-LVL III: CPT | Mod: PBBFAC,,,

## 2018-01-03 NOTE — PROGRESS NOTES
Consult/H&P Note  Interventional Radiology    Consult Requested By: high risk OB    Reason for Consult: placenta accreta/percreta    SUBJECTIVE:     Chief Complaint: placenta accreta/percreta    History of Present Illness: 31 yo F at 34 weeks gestation with placenta accreta/percreta. 2 prior C sections.    Past Medical History:   Diagnosis Date    MRSA (methicillin resistant staph aureus) culture positive      Past Surgical History:   Procedure Laterality Date     SECTION      x2     MANDIBLE SURGERY      TONSILLECTOMY       Family History   Problem Relation Age of Onset    Hypertension Mother     No Known Problems Father      Social History   Substance Use Topics    Smoking status: Never Smoker    Smokeless tobacco: Never Used    Alcohol use No       Review of Systems:  Constitutional/General:No fever, chills, change in appetite or weight loss.  Hematological/Immuno: no known coagulopathies  Respiratory: no shortness of breath  Cardiovascular: no chest pain  Gastrointestinal: no abdominal pain  Genito-Urinary: positive for - gravid uterus  Neurological: no TIA or stroke symptoms  Psychiatric: normal mood/affect, good insight/judgement      OBJECTIVE:     Vital Signs Range (Last 24H):  [unfilled]    Physical Exam:  General- Patient alert and oriented x3 in NAD  ENT- PERRLA,  Neck- No masses  CV- Regular rate and rhythm  Resp-  No increased WOB  GI- Non tender/non-distended  Extrem- No cyanosis, clubbing, edema.   Derm- No rashes, masses, or lesions noted  Neuro-  No focal deficits noted.     Physical Exam  Body mass index is 42.8 kg/m².    Scheduled Meds:   Continuous Infusions:   PRN Meds:    Allergies:   Review of patient's allergies indicates:   Allergen Reactions    Pcn [penicillins] Hives       Labs:  @LABRCNTIP(INR, PT, PTT)@  @LABRCNTIP(WBC,HGB,HCT,MCV,PLT)@ @LABRCNTIP(GLU,NA,K,Cl,CO2,BUN,Creatinine,Calcium,MG,ALT,AST,ALBUMIN,bilitot,bilidir)@    Vitals (Most Recent):  Pulse: (!) 114  (18 1306)  BP: 120/75 (18 1306)    ASA: 2  Mallampati: 2    Consent obtained    ASSESSMENT/PLAN:     Plan for delivery of the  via C section at 9 am tomorrow, followed by bilateral uterine artery embolization of the placenta left in-situ.  Sedation per anesthesia.    There are no hospital problems to display for this patient.          Collin Flores MD

## 2018-01-04 ENCOUNTER — ANESTHESIA (OUTPATIENT)
Dept: OBSTETRICS AND GYNECOLOGY | Facility: OTHER | Age: 33
End: 2018-01-04
Payer: COMMERCIAL

## 2018-01-04 ENCOUNTER — SURGERY (OUTPATIENT)
Age: 33
End: 2018-01-04

## 2018-01-04 ENCOUNTER — HOSPITAL ENCOUNTER (INPATIENT)
Facility: OTHER | Age: 33
LOS: 4 days | Discharge: HOME | End: 2018-01-08
Attending: OBSTETRICS & GYNECOLOGY | Admitting: OBSTETRICS & GYNECOLOGY
Payer: COMMERCIAL

## 2018-01-04 DIAGNOSIS — Z98.891 STATUS POST CESAREAN DELIVERY: Primary | ICD-10-CM

## 2018-01-04 DIAGNOSIS — O43.239: ICD-10-CM

## 2018-01-04 DIAGNOSIS — L02.419 THIGH ABSCESS: ICD-10-CM

## 2018-01-04 DIAGNOSIS — O43.92 ABNORMAL PLACENTA AFFECTING MANAGEMENT OF MOTHER IN SECOND TRIMESTER: ICD-10-CM

## 2018-01-04 PROBLEM — Z86.14 HISTORY OF MRSA INFECTION: Status: ACTIVE | Noted: 2017-10-19

## 2018-01-04 LAB
ALLENS TEST: ABNORMAL
ANISOCYTOSIS BLD QL SMEAR: SLIGHT
BASOPHILS # BLD AUTO: 0.02 K/UL
BASOPHILS NFR BLD: 0.1 %
DIFFERENTIAL METHOD: ABNORMAL
EOSINOPHIL # BLD AUTO: 0.3 K/UL
EOSINOPHIL NFR BLD: 1.7 %
ERYTHROCYTE [DISTWIDTH] IN BLOOD BY AUTOMATED COUNT: 14.2 %
HCO3 UR-SCNC: 26.1 MMOL/L (ref 24–28)
HCT VFR BLD AUTO: 36.2 %
HGB BLD-MCNC: 11.6 G/DL
HYPOCHROMIA BLD QL SMEAR: ABNORMAL
LYMPHOCYTES # BLD AUTO: 2.5 K/UL
LYMPHOCYTES NFR BLD: 16.8 %
MCH RBC QN AUTO: 28.2 PG
MCHC RBC AUTO-ENTMCNC: 32 G/DL
MCV RBC AUTO: 88 FL
MONOCYTES # BLD AUTO: 1.1 K/UL
MONOCYTES NFR BLD: 7.7 %
NEUTROPHILS # BLD AUTO: 10.6 K/UL
NEUTROPHILS NFR BLD: 73.7 %
PCO2 BLDA: 47.6 MMHG (ref 35–45)
PH SMN: 7.35 [PH] (ref 7.35–7.45)
PLATELET # BLD AUTO: 221 K/UL
PLATELET BLD QL SMEAR: ABNORMAL
PMV BLD AUTO: 10.1 FL
PO2 BLDA: 20 MMHG (ref 80–100)
POC BE: 0 MMOL/L
POC SATURATED O2: 29 % (ref 95–100)
POIKILOCYTOSIS BLD QL SMEAR: SLIGHT
POLYCHROMASIA BLD QL SMEAR: ABNORMAL
RBC # BLD AUTO: 4.11 M/UL
SAMPLE: ABNORMAL
SITE: ABNORMAL
STOMATOCYTES BLD QL SMEAR: PRESENT
WBC # BLD AUTO: 14.83 K/UL

## 2018-01-04 PROCEDURE — 25000003 PHARM REV CODE 250: Performed by: RADIOLOGY

## 2018-01-04 PROCEDURE — 27201224 HC CATH ANGIOGRAM: Performed by: RADIOLOGY

## 2018-01-04 PROCEDURE — C1894 INTRO/SHEATH, NON-LASER: HCPCS | Performed by: RADIOLOGY

## 2018-01-04 PROCEDURE — 37000008 HC ANESTHESIA 1ST 15 MINUTES: Performed by: OBSTETRICS & GYNECOLOGY

## 2018-01-04 PROCEDURE — 25500020 PHARM REV CODE 255: Performed by: RADIOLOGY

## 2018-01-04 PROCEDURE — S0077 INJECTION, CLINDAMYCIN PHOSP: HCPCS | Performed by: OBSTETRICS & GYNECOLOGY

## 2018-01-04 PROCEDURE — 49000 EXPLORATION OF ABDOMEN: CPT | Performed by: OBSTETRICS & GYNECOLOGY

## 2018-01-04 PROCEDURE — 94761 N-INVAS EAR/PLS OXIMETRY MLT: CPT

## 2018-01-04 PROCEDURE — 99152 MOD SED SAME PHYS/QHP 5/>YRS: CPT | Performed by: RADIOLOGY

## 2018-01-04 PROCEDURE — 25000003 PHARM REV CODE 250

## 2018-01-04 PROCEDURE — 63600175 PHARM REV CODE 636 W HCPCS: Performed by: ANESTHESIOLOGY

## 2018-01-04 PROCEDURE — 25500020 PHARM REV CODE 255

## 2018-01-04 PROCEDURE — 25000003 PHARM REV CODE 250: Performed by: OBSTETRICS & GYNECOLOGY

## 2018-01-04 PROCEDURE — 63600175 PHARM REV CODE 636 W HCPCS: Performed by: OBSTETRICS & GYNECOLOGY

## 2018-01-04 PROCEDURE — 36000685 HC CESAREAN SECTION LEVEL I: Performed by: OBSTETRICS & GYNECOLOGY

## 2018-01-04 PROCEDURE — 63600175 PHARM REV CODE 636 W HCPCS

## 2018-01-04 PROCEDURE — 99153 MOD SED SAME PHYS/QHP EA: CPT | Performed by: RADIOLOGY

## 2018-01-04 PROCEDURE — 25000003 PHARM REV CODE 250: Performed by: ANESTHESIOLOGY

## 2018-01-04 PROCEDURE — 59514 CESAREAN DELIVERY ONLY: CPT | Mod: 82,AT,, | Performed by: OBSTETRICS & GYNECOLOGY

## 2018-01-04 PROCEDURE — 86920 COMPATIBILITY TEST SPIN: CPT

## 2018-01-04 PROCEDURE — 04LE3ZT OCCLUSION OF RIGHT UTERINE ARTERY, PERCUTANEOUS APPROACH: ICD-10-PCS | Performed by: OBSTETRICS & GYNECOLOGY

## 2018-01-04 PROCEDURE — 04LF3ZU OCCLUSION OF LEFT UTERINE ARTERY, PERCUTANEOUS APPROACH: ICD-10-PCS | Performed by: OBSTETRICS & GYNECOLOGY

## 2018-01-04 PROCEDURE — 20000000 HC ICU ROOM

## 2018-01-04 PROCEDURE — 37000009 HC ANESTHESIA EA ADD 15 MINS: Performed by: OBSTETRICS & GYNECOLOGY

## 2018-01-04 PROCEDURE — C1769 GUIDE WIRE: HCPCS | Performed by: RADIOLOGY

## 2018-01-04 PROCEDURE — S0028 INJECTION, FAMOTIDINE, 20 MG: HCPCS | Performed by: OBSTETRICS & GYNECOLOGY

## 2018-01-04 PROCEDURE — 59515 CESAREAN DELIVERY: CPT | Mod: AT,,, | Performed by: OBSTETRICS & GYNECOLOGY

## 2018-01-04 PROCEDURE — 63600175 PHARM REV CODE 636 W HCPCS: Performed by: RADIOLOGY

## 2018-01-04 PROCEDURE — 85025 COMPLETE CBC W/AUTO DIFF WBC: CPT

## 2018-01-04 PROCEDURE — C1887 CATHETER, GUIDING: HCPCS | Performed by: RADIOLOGY

## 2018-01-04 PROCEDURE — 59514 CESAREAN DELIVERY ONLY: CPT | Mod: ,,, | Performed by: ANESTHESIOLOGY

## 2018-01-04 PROCEDURE — 36620 INSERTION CATHETER ARTERY: CPT | Mod: 59,,, | Performed by: ANESTHESIOLOGY

## 2018-01-04 RX ORDER — DOCUSATE SODIUM 100 MG/1
200 CAPSULE, LIQUID FILLED ORAL 2 TIMES DAILY
Status: DISCONTINUED | OUTPATIENT
Start: 2018-01-04 | End: 2018-01-08 | Stop reason: HOSPADM

## 2018-01-04 RX ORDER — MISOPROSTOL 200 UG/1
800 TABLET ORAL
Status: DISCONTINUED | OUTPATIENT
Start: 2018-01-04 | End: 2018-01-04

## 2018-01-04 RX ORDER — OXYCODONE HYDROCHLORIDE 5 MG/1
10 TABLET ORAL EVERY 4 HOURS PRN
Status: DISCONTINUED | OUTPATIENT
Start: 2018-01-04 | End: 2018-01-05

## 2018-01-04 RX ORDER — SIMETHICONE 80 MG
1 TABLET,CHEWABLE ORAL EVERY 6 HOURS PRN
Status: DISCONTINUED | OUTPATIENT
Start: 2018-01-04 | End: 2018-01-08 | Stop reason: HOSPADM

## 2018-01-04 RX ORDER — FAMOTIDINE 10 MG/ML
20 INJECTION INTRAVENOUS
Status: DISCONTINUED | OUTPATIENT
Start: 2018-01-04 | End: 2018-01-04

## 2018-01-04 RX ORDER — SODIUM CHLORIDE, SODIUM LACTATE, POTASSIUM CHLORIDE, CALCIUM CHLORIDE 600; 310; 30; 20 MG/100ML; MG/100ML; MG/100ML; MG/100ML
INJECTION, SOLUTION INTRAVENOUS CONTINUOUS
Status: DISCONTINUED | OUTPATIENT
Start: 2018-01-04 | End: 2018-01-05

## 2018-01-04 RX ORDER — SODIUM CITRATE AND CITRIC ACID MONOHYDRATE 334; 500 MG/5ML; MG/5ML
30 SOLUTION ORAL
Status: DISCONTINUED | OUTPATIENT
Start: 2018-01-04 | End: 2018-01-04

## 2018-01-04 RX ORDER — HYDROCODONE BITARTRATE AND ACETAMINOPHEN 500; 5 MG/1; MG/1
TABLET ORAL
Status: DISCONTINUED | OUTPATIENT
Start: 2018-01-04 | End: 2018-01-04

## 2018-01-04 RX ORDER — CLINDAMYCIN PHOSPHATE 900 MG/50ML
900 INJECTION, SOLUTION INTRAVENOUS
Status: DISCONTINUED | OUTPATIENT
Start: 2018-01-04 | End: 2018-01-04

## 2018-01-04 RX ORDER — DIPHENHYDRAMINE HCL 25 MG
25 CAPSULE ORAL EVERY 4 HOURS PRN
Status: DISCONTINUED | OUTPATIENT
Start: 2018-01-04 | End: 2018-01-08 | Stop reason: HOSPADM

## 2018-01-04 RX ORDER — BISACODYL 10 MG
10 SUPPOSITORY, RECTAL RECTAL ONCE AS NEEDED
Status: ACTIVE | OUTPATIENT
Start: 2018-01-04 | End: 2018-01-04

## 2018-01-04 RX ORDER — HYDROCORTISONE 25 MG/G
CREAM TOPICAL 3 TIMES DAILY PRN
Status: DISCONTINUED | OUTPATIENT
Start: 2018-01-04 | End: 2018-01-08 | Stop reason: HOSPADM

## 2018-01-04 RX ORDER — FENTANYL CITRATE 50 UG/ML
INJECTION, SOLUTION INTRAMUSCULAR; INTRAVENOUS
Status: COMPLETED
Start: 2018-01-04 | End: 2018-01-04

## 2018-01-04 RX ORDER — ACETAMINOPHEN 325 MG/1
650 TABLET ORAL EVERY 6 HOURS
Status: DISCONTINUED | OUTPATIENT
Start: 2018-01-04 | End: 2018-01-05

## 2018-01-04 RX ORDER — LIDOCAINE HYDROCHLORIDE 10 MG/ML
INJECTION, SOLUTION EPIDURAL; INFILTRATION; INTRACAUDAL; PERINEURAL
Status: DISCONTINUED | OUTPATIENT
Start: 2018-01-04 | End: 2018-01-08 | Stop reason: HOSPADM

## 2018-01-04 RX ORDER — OXYCODONE AND ACETAMINOPHEN 5; 325 MG/1; MG/1
1 TABLET ORAL EVERY 4 HOURS PRN
Status: DISCONTINUED | OUTPATIENT
Start: 2018-01-05 | End: 2018-01-05

## 2018-01-04 RX ORDER — FENTANYL CITRATE 50 UG/ML
INJECTION, SOLUTION INTRAMUSCULAR; INTRAVENOUS
Status: DISCONTINUED | OUTPATIENT
Start: 2018-01-04 | End: 2018-01-05

## 2018-01-04 RX ORDER — ONDANSETRON 2 MG/ML
INJECTION INTRAMUSCULAR; INTRAVENOUS
Status: DISCONTINUED | OUTPATIENT
Start: 2018-01-04 | End: 2018-01-05

## 2018-01-04 RX ORDER — OXYCODONE AND ACETAMINOPHEN 10; 325 MG/1; MG/1
1 TABLET ORAL EVERY 4 HOURS PRN
Status: DISCONTINUED | OUTPATIENT
Start: 2018-01-05 | End: 2018-01-05

## 2018-01-04 RX ORDER — FENTANYL CITRATE 50 UG/ML
INJECTION, SOLUTION INTRAMUSCULAR; INTRAVENOUS
Status: DISCONTINUED | OUTPATIENT
Start: 2018-01-04 | End: 2018-01-04

## 2018-01-04 RX ORDER — ONDANSETRON 8 MG/1
8 TABLET, ORALLY DISINTEGRATING ORAL EVERY 8 HOURS PRN
Status: DISCONTINUED | OUTPATIENT
Start: 2018-01-04 | End: 2018-01-08 | Stop reason: HOSPADM

## 2018-01-04 RX ORDER — ADHESIVE BANDAGE
30 BANDAGE TOPICAL 2 TIMES DAILY PRN
Status: DISCONTINUED | OUTPATIENT
Start: 2018-01-05 | End: 2018-01-08 | Stop reason: HOSPADM

## 2018-01-04 RX ORDER — SODIUM CHLORIDE 0.9 % (FLUSH) 0.9 %
3 SYRINGE (ML) INJECTION
Status: DISCONTINUED | OUTPATIENT
Start: 2018-01-04 | End: 2018-01-04

## 2018-01-04 RX ORDER — IBUPROFEN 600 MG/1
600 TABLET ORAL EVERY 6 HOURS
Status: DISCONTINUED | OUTPATIENT
Start: 2018-01-05 | End: 2018-01-05

## 2018-01-04 RX ORDER — ONDANSETRON 2 MG/ML
4 INJECTION INTRAMUSCULAR; INTRAVENOUS EVERY 6 HOURS PRN
Status: DISCONTINUED | OUTPATIENT
Start: 2018-01-04 | End: 2018-01-08 | Stop reason: HOSPADM

## 2018-01-04 RX ORDER — ONDANSETRON 8 MG/1
8 TABLET, ORALLY DISINTEGRATING ORAL EVERY 8 HOURS PRN
Status: DISCONTINUED | OUTPATIENT
Start: 2018-01-04 | End: 2018-01-04

## 2018-01-04 RX ORDER — OXYCODONE HYDROCHLORIDE 5 MG/1
5 TABLET ORAL EVERY 4 HOURS PRN
Status: DISCONTINUED | OUTPATIENT
Start: 2018-01-04 | End: 2018-01-05

## 2018-01-04 RX ORDER — MORPHINE SULFATE 0.5 MG/ML
INJECTION, SOLUTION EPIDURAL; INTRATHECAL; INTRAVENOUS
Status: DISPENSED
Start: 2018-01-04 | End: 2018-01-04

## 2018-01-04 RX ORDER — PHENYLEPHRINE HYDROCHLORIDE 10 MG/ML
INJECTION INTRAVENOUS
Status: DISCONTINUED | OUTPATIENT
Start: 2018-01-04 | End: 2018-01-05

## 2018-01-04 RX ORDER — MUPIROCIN 20 MG/G
OINTMENT TOPICAL
Status: DISCONTINUED | OUTPATIENT
Start: 2018-01-04 | End: 2018-01-04

## 2018-01-04 RX ORDER — MIDAZOLAM HYDROCHLORIDE 1 MG/ML
INJECTION, SOLUTION INTRAMUSCULAR; INTRAVENOUS
Status: DISCONTINUED | OUTPATIENT
Start: 2018-01-04 | End: 2018-01-04

## 2018-01-04 RX ORDER — CARBOPROST TROMETHAMINE 250 UG/ML
INJECTION, SOLUTION INTRAMUSCULAR
Status: DISCONTINUED
Start: 2018-01-04 | End: 2018-01-04 | Stop reason: WASHOUT

## 2018-01-04 RX ORDER — HYDROMORPHONE HCL IN 0.9% NACL 6 MG/30 ML
PATIENT CONTROLLED ANALGESIA SYRINGE INTRAVENOUS CONTINUOUS
Status: DISCONTINUED | OUTPATIENT
Start: 2018-01-04 | End: 2018-01-05

## 2018-01-04 RX ORDER — AMOXICILLIN 250 MG
1 CAPSULE ORAL NIGHTLY PRN
Status: DISCONTINUED | OUTPATIENT
Start: 2018-01-04 | End: 2018-01-08 | Stop reason: HOSPADM

## 2018-01-04 RX ORDER — LIDOCAINE HCL/EPINEPHRINE/PF 2%-1:200K
VIAL (ML) INJECTION
Status: DISPENSED
Start: 2018-01-04 | End: 2018-01-04

## 2018-01-04 RX ORDER — KETOROLAC TROMETHAMINE 30 MG/ML
30 INJECTION, SOLUTION INTRAMUSCULAR; INTRAVENOUS EVERY 6 HOURS
Status: COMPLETED | OUTPATIENT
Start: 2018-01-04 | End: 2018-01-05

## 2018-01-04 RX ORDER — METHYLERGONOVINE MALEATE 0.2 MG/ML
INJECTION INTRAVENOUS
Status: DISCONTINUED
Start: 2018-01-04 | End: 2018-01-04 | Stop reason: WASHOUT

## 2018-01-04 RX ORDER — MUPIROCIN 20 MG/G
1 OINTMENT TOPICAL 2 TIMES DAILY
Status: DISCONTINUED | OUTPATIENT
Start: 2018-01-04 | End: 2018-01-08 | Stop reason: HOSPADM

## 2018-01-04 RX ORDER — MORPHINE SULFATE 1 MG/ML
INJECTION, SOLUTION EPIDURAL; INTRATHECAL; INTRAVENOUS
Status: DISCONTINUED | OUTPATIENT
Start: 2018-01-04 | End: 2018-01-05

## 2018-01-04 RX ADMIN — FENTANYL CITRATE 50 MCG: 50 INJECTION, SOLUTION INTRAMUSCULAR; INTRAVENOUS at 12:01

## 2018-01-04 RX ADMIN — ONDANSETRON 4 MG: 2 INJECTION INTRAMUSCULAR; INTRAVENOUS at 09:01

## 2018-01-04 RX ADMIN — DIPHENHYDRAMINE HYDROCHLORIDE 25 MG: 25 CAPSULE ORAL at 03:01

## 2018-01-04 RX ADMIN — FENTANYL CITRATE 25 MCG: 50 INJECTION, SOLUTION INTRAMUSCULAR; INTRAVENOUS at 11:01

## 2018-01-04 RX ADMIN — GENTAMICIN SULFATE 160 MG: 40 INJECTION, SOLUTION INTRAMUSCULAR; INTRAVENOUS at 09:01

## 2018-01-04 RX ADMIN — PHENYLEPHRINE HYDROCHLORIDE 200 MCG: 10 INJECTION INTRAVENOUS at 10:01

## 2018-01-04 RX ADMIN — FENTANYL CITRATE 10 MCG: 50 INJECTION, SOLUTION INTRAMUSCULAR; INTRAVENOUS at 09:01

## 2018-01-04 RX ADMIN — ACETAMINOPHEN 650 MG: 325 TABLET ORAL at 05:01

## 2018-01-04 RX ADMIN — MUPIROCIN 1 G: 20 OINTMENT TOPICAL at 08:01

## 2018-01-04 RX ADMIN — CLINDAMYCIN IN 5 PERCENT DEXTROSE 900 MG: 18 INJECTION, SOLUTION INTRAVENOUS at 08:01

## 2018-01-04 RX ADMIN — PHENYLEPHRINE HYDROCHLORIDE 200 MCG: 10 INJECTION INTRAVENOUS at 09:01

## 2018-01-04 RX ADMIN — SODIUM CITRATE AND CITRIC ACID MONOHYDRATE 30 ML: 500; 334 SOLUTION ORAL at 08:01

## 2018-01-04 RX ADMIN — OXYCODONE HYDROCHLORIDE 10 MG: 5 TABLET ORAL at 02:01

## 2018-01-04 RX ADMIN — KETOROLAC TROMETHAMINE 30 MG: 30 INJECTION, SOLUTION INTRAMUSCULAR at 06:01

## 2018-01-04 RX ADMIN — MIDAZOLAM 0.5 MG: 1 INJECTION INTRAMUSCULAR; INTRAVENOUS at 12:01

## 2018-01-04 RX ADMIN — FENTANYL CITRATE 25 MCG: 50 INJECTION, SOLUTION INTRAMUSCULAR; INTRAVENOUS at 01:01

## 2018-01-04 RX ADMIN — MIDAZOLAM 0.5 MG: 1 INJECTION INTRAMUSCULAR; INTRAVENOUS at 01:01

## 2018-01-04 RX ADMIN — MORPHINE SULFATE 0.15 MG: 1 INJECTION, SOLUTION EPIDURAL; INTRATHECAL; INTRAVENOUS at 09:01

## 2018-01-04 RX ADMIN — Medication: at 08:01

## 2018-01-04 RX ADMIN — PHENYLEPHRINE HYDROCHLORIDE 100 MCG: 10 INJECTION INTRAVENOUS at 09:01

## 2018-01-04 RX ADMIN — IOHEXOL 250 ML: 300 INJECTION, SOLUTION INTRAVENOUS at 02:01

## 2018-01-04 RX ADMIN — VANCOMYCIN HYDROCHLORIDE 1000 MG: 1 INJECTION, POWDER, LYOPHILIZED, FOR SOLUTION INTRAVENOUS at 10:01

## 2018-01-04 RX ADMIN — LIDOCAINE HYDROCHLORIDE 5 ML: 10 INJECTION, SOLUTION EPIDURAL; INFILTRATION; INTRACAUDAL; PERINEURAL at 02:01

## 2018-01-04 RX ADMIN — SODIUM CHLORIDE, SODIUM LACTATE, POTASSIUM CHLORIDE, AND CALCIUM CHLORIDE: 600; 310; 30; 20 INJECTION, SOLUTION INTRAVENOUS at 06:01

## 2018-01-04 RX ADMIN — MIDAZOLAM 1 MG: 1 INJECTION INTRAMUSCULAR; INTRAVENOUS at 11:01

## 2018-01-04 RX ADMIN — CLINDAMYCIN IN 5 PERCENT DEXTROSE 900 MG: 18 INJECTION, SOLUTION INTRAVENOUS at 09:01

## 2018-01-04 RX ADMIN — FAMOTIDINE 20 MG: 10 INJECTION, SOLUTION INTRAVENOUS at 08:01

## 2018-01-04 RX ADMIN — DOCUSATE SODIUM 200 MG: 100 CAPSULE, LIQUID FILLED ORAL at 08:01

## 2018-01-04 NOTE — ANESTHESIA PROCEDURE NOTES
Arterial    Diagnosis: placenta percreta    Patient location during procedure: done in OR  Procedure start time: 1/4/2018 9:49 AM  Timeout: 1/4/2018 9:48 AM  Procedure end time: 1/4/2018 9:50 AM  Staffing  Anesthesiologist: KIERRA TRUONG  Resident/CRNA: PREET GARNER  Performed: resident/CRNA   Anesthesiologist was present at the time of the procedure.  Preanesthetic Checklist  Completed: patient identified, site marked, surgical consent, pre-op evaluation, timeout performed, IV checked, risks and benefits discussed, monitors and equipment checked and anesthesia consent givenArterial  Skin Prep: chlorhexidine gluconate  Local Infiltration: lidocaine  Orientation: left  Location: radial  Catheter Size: 20 G  Catheter placement by Anatomical landmarks. Heme positive aspiration all ports.Insertion Attempts: 1  Assessment  Dressing: secured with tape and tegaderm  Patient: Tolerated well

## 2018-01-04 NOTE — PROGRESS NOTES
Spoke with the patient and her  this morning.  Reviewed the plan for delivery followed by uterine and placental assessment.  If she is stable, plan to close and transport to IR for embolization and interval hysterectomy.  If not, then will plan hysterectomy and OIP.  The risks, benefits, and indications of the procedure were discussed with her and her .  These included bleeding, infection, damage to surrounding tissues, urologic resection and reconstruction, prolonged catheterization of the bladder, fistula, massive hemorrhage requiring transfusion, prolonged ICU stay, and possible death.  She voiced understanding, all questions were answered.  Anesthesia, OR, blood bank, and IR aware.

## 2018-01-04 NOTE — H&P
H & P updated from 1/3/18. Patient now s/p  with plan for bilateral uterine artery embolization. Informed consent obtained. Moderate sedation.    Jonathan Barnett MD  Department of Radiology  Pager: [phone]

## 2018-01-04 NOTE — ASSESSMENT & PLAN NOTE
- Patient admitted to L&D  - On call and Attending Staff notified  - Anesthesia and Charge Nurse notified  - CBC and Type and Screen drawn  - Type and Matched for 4 units of blood, FFP per Anesthesia  - Consents signed and placed in chart  - Category 1 tracing  - Will proceed to main OR for  section with possible c-hyst

## 2018-01-04 NOTE — ANESTHESIA PREPROCEDURE EVALUATION
2018  Parth Latif is a 32 y.o. , female with h/o C/S X 2 who presents with suspected placenta percreta for planned  delivery +/- hysterectomy followed by IR embolization. Per Gyn/Onc plan is for interval hysterectomy.      OB History    Para Term  AB Living   3 2 2     2   SAB TAB Ectopic Multiple Live Births           2      # Outcome Date GA Lbr Anant/2nd Weight Sex Delivery Anes PTL Lv   3 Current            2 Term  39w0d  3.629 kg (8 lb) F CS-LTranv  N HERO      Birth Comments: repeat CS, s/p cs staph infection to LT abdominal incision, hospitalized x 1 week    1 Term  39w0d  3.487 kg (7 lb 11 oz) M CS-LTranv  N HERO      Complications: Breech presentation, antepartum      Birth Comments: breech          Wt Readings from Last 1 Encounters:   18 1306 106.1 kg (234 lb 0.3 oz)       BP Readings from Last 3 Encounters:   18 120/75   17 114/68   17 126/61       Patient Active Problem List   Diagnosis    Abnormal placenta affecting management of mother in second trimester    Abscess of right thigh    MRSA infection    Allergy to clindamycin    Pregnancy       Past Surgical History:   Procedure Laterality Date     SECTION      x2     MANDIBLE SURGERY      TONSILLECTOMY         Social History     Social History    Marital status:      Spouse name: N/A    Number of children: N/A    Years of education: N/A     Occupational History    Not on file.     Social History Main Topics    Smoking status: Never Smoker    Smokeless tobacco: Never Used    Alcohol use No    Drug use: No    Sexual activity: Yes     Partners: Male     Birth control/ protection: IUD     Other Topics Concern    Not on file     Social History Narrative    No narrative on file         Chemistry        Component Value Date/Time     2018 1245     K 4.1 01/03/2018 1245     01/03/2018 1245    CO2 26 01/03/2018 1245    BUN 9 01/03/2018 1245    CREATININE 0.6 01/03/2018 1245     (H) 01/03/2018 1245        Component Value Date/Time    CALCIUM 9.5 01/03/2018 1245    ESTGFRAFRICA >60 01/03/2018 1245    EGFRNONAA >60 01/03/2018 1245            Lab Results   Component Value Date    WBC 16.68 (H) 01/03/2018    HGB 11.5 (L) 01/03/2018    HCT 36.0 (L) 01/03/2018    MCV 89 01/03/2018     01/03/2018       Recent Labs      01/03/18   1245   INR  0.9   APTT  25.0                   Anesthesia Evaluation    I have reviewed the Patient Summary Reports.    I have reviewed the Nursing Notes.   I have reviewed the Medications.     Review of Systems  Anesthesia Hx:  No problems with GETA, pt reports difficulty with both previous spinals due to paresthesias, requiring multiple passes History of prior surgery of interest to airway management or planning: Previous anesthesia: General, Spinal Denies Family Hx of Anesthesia complications.   Denies Personal Hx of Anesthesia complications.   Social:  Non-Smoker    Cardiovascular:  Cardiovascular Normal     Pulmonary:  Pulmonary Normal    Renal/:  Renal/ Normal     Hepatic/GI:  Hepatic/GI Normal    Neurological:  Neurology Normal    Endocrine:  Endocrine Normal      Lab Results   Component Value Date    WBC 16.68 (H) 01/03/2018    HGB 11.5 (L) 01/03/2018    HCT 36.0 (L) 01/03/2018    MCV 89 01/03/2018     01/03/2018     CMP  Sodium   Date Value Ref Range Status   01/03/2018 136 136 - 145 mmol/L Final     Potassium   Date Value Ref Range Status   01/03/2018 4.1 3.5 - 5.1 mmol/L Final     Chloride   Date Value Ref Range Status   01/03/2018 102 95 - 110 mmol/L Final     CO2   Date Value Ref Range Status   01/03/2018 26 23 - 29 mmol/L Final     Glucose   Date Value Ref Range Status   01/03/2018 122 (H) 70 - 110 mg/dL Final     BUN, Bld   Date Value Ref Range Status   01/03/2018 9 6 - 20 mg/dL Final      Creatinine   Date Value Ref Range Status   01/03/2018 0.6 0.5 - 1.4 mg/dL Final     Calcium   Date Value Ref Range Status   01/03/2018 9.5 8.7 - 10.5 mg/dL Final     Anion Gap   Date Value Ref Range Status   01/03/2018 8 8 - 16 mmol/L Final     eGFR if    Date Value Ref Range Status   01/03/2018 >60 >60 mL/min/1.73 m^2 Final     eGFR if non    Date Value Ref Range Status   01/03/2018 >60 >60 mL/min/1.73 m^2 Final     Comment:     Calculation used to obtain the estimated glomerular filtration  rate (eGFR) is the CKD-EPI equation.        Lab Results   Component Value Date    INR 0.9 01/03/2018             Physical Exam  General:  Well nourished    Airway/Jaw/Neck:  Airway Findings: Mouth Opening: Normal Tongue: Normal  General Airway Assessment: Adult  Mallampati: I  TM Distance: Normal, at least 6 cm  Jaw/Neck Findings:  Neck ROM: Normal ROM      Dental:  Dental Findings: In tact   Chest/Lungs:  Chest/Lungs Findings: Normal Respiratory Rate     Heart/Vascular:  Heart Findings: Rate: Normal  Rhythm: Regular Rhythm        Mental Status:  Mental Status Findings:  Cooperative, Alert and Oriented         Anesthesia Plan  Type of Anesthesia, risks & benefits discussed:  Anesthesia Type:  CSE, epidural, general  Patient's Preference:   Intra-op Monitoring Plan:   Intra-op Monitoring Plan Comments:   Post Op Pain Control Plan:   Post Op Pain Control Plan Comments:   Induction:    Beta Blocker:  Patient is not currently on a Beta-Blocker (No further documentation required).       Informed Consent: Patient understands risks and agrees with Anesthesia plan.  Questions answered. Anesthesia consent signed with patient.  ASA Score: 4     Day of Surgery Review of History & Physical:    H&P update referred to the surgeon.     Anesthesia Plan Notes: Plan for CSE, large bore IV access, a-line. T&C 4 prbc 4 FFP.        Ready For Surgery From Anesthesia Perspective.

## 2018-01-04 NOTE — H&P
[facility]  Obstetrics  History & Physical    Patient Name: Parth Latif  MRN: [id number]  Admission Date: 2018  Primary Care Provider: Nils Barnes MD    Subjective:     Principal Problem:Placenta percreta    History of Present Illness:   Parth Latif is a 32 y.o.  female with IUP at 34w2d gestation who presents for scheduled  section with pregnancy complicated by complete placenta previa and placenta percreta. There is high suspicion that there may be bladder involvement. She is doing well this morning. Patient denies contractions and vaginal bleeding at this time. She reports normal fetal movement. She is tolerating a regular diet and denies nausea and vomiting. She has met with IR as patient is also scheduled for immediate bilateral. She has a medical history of upper thigh abscess that was positive for MRSA during her last pregnancy. She has a  section x2.      Obstetric History       T2      L2     SAB0   TAB0   Ectopic0   Multiple0   Live Births2       # Outcome Date GA Lbr Anant/2nd Weight Sex Delivery Anes PTL Lv   3 Current            2 Term  39w0d  3.629 kg (8 lb) F CS-LTranv  N HERO      Name: Gray   1 Term  39w0d  3.487 kg (7 lb 11 oz) M CS-LTranv  N HERO      Name: Carter      Complications: Breech presentation, antepartum        Past Medical History:   Diagnosis Date    MRSA (methicillin resistant staph aureus) culture positive      Past Surgical History:   Procedure Laterality Date     SECTION      x2     MANDIBLE SURGERY      TONSILLECTOMY         PTA Medications   Medication Sig    PNV,CALCIUM 72/IRON/FOLIC ACID (PRENATAL VITAMIN) Tab Take 1 tablet by mouth once daily.       Review of patient's allergies indicates:   Allergen Reactions    Pcn [penicillins] Hives        Family History     Problem Relation (Age of Onset)    Hypertension Mother    No Known Problems Father        Social History Main Topics     Smoking status: Never Smoker    Smokeless tobacco: Never Used    Alcohol use No    Drug use: No    Sexual activity: Yes     Partners: Male     Birth control/ protection: IUD     Review of Systems   Constitutional: Negative for activity change, appetite change and fever.   Respiratory: Negative for shortness of breath.    Cardiovascular: Negative for chest pain and palpitations.   Gastrointestinal: Negative for abdominal pain, blood in stool, nausea and vomiting.   Endocrine: Negative for diabetes.   Genitourinary: Negative for hematuria, vaginal bleeding, vaginal discharge and vaginal odor.   Neurological: Negative for headaches.   Psychiatric/Behavioral: Negative for depression.      Objective:     Vital Signs (Most Recent):  Pulse: (!) 121 (01/04/18 0755)  BP: 135/74 (01/04/18 0750)  SpO2: 99 % (01/04/18 0755) Vital Signs (24h Range):  Pulse:  [114-121] 121  SpO2:  [99 %] 99 %  BP: (120-135)/(74-75) 135/74     Weight: 106 kg (233 lb 11 oz)  Body mass index is 42.74 kg/m².    FHT: Cat 1 (reassuring)  130bpm, moderate BTBV, +acclerations, no deceleration  TOCO: irritability with one contraction in 20 minutes    Physical Exam:   Constitutional: She is oriented to person, place, and time. She appears well-developed and well-nourished.       Cardiovascular: Normal rate.          Abdominal: Soft. There is no tenderness.   35 week gravid uterus but no abdominal pain with gentle palpation                 Neurological: She is alert and oriented to person, place, and time.    Skin: Skin is warm and dry.    Psychiatric: She has a normal mood and affect. Her behavior is normal.       Cervix: deferred      Significant Labs:  Lab Results   Component Value Date    GROUPTRH B POS 01/03/2018    HEPBSAG Non-reactive 06/14/2017       CBC:   Recent Labs  Lab 01/03/18  1245   WBC 16.68*   RBC 4.06   HGB 11.5*   HCT 36.0*      MCV 89   MCH 28.3   MCHC 31.9*     CMP:   Recent Labs  Lab 01/03/18  1245   *   CALCIUM  9.5      K 4.1   CO2 26      BUN 9   CREATININE 0.6     I have personallly reviewed all pertinent lab results from the last 24 hours.    Assessment/Plan:     32 y.o. female  at 34w2d for:    * Placenta percreta    - Patient admitted to L&D  - On call and Attending Staff notified  - Anesthesia and Charge Nurse notified  - CBC and Type and Screen drawn  - Type and Matched for 4 units of blood, FFP per Anesthesia  - Consents signed and placed in chart  - Category 1 tracing  - Will proceed to main OR for  section with possible c-hyst        History of MRSA infection    - Vanc added to surgical ppx antibiotics  - Will place Mupirocin in nasal nares prior to procedure            Chente Armstrong MD  Obstetrics  [facility]

## 2018-01-04 NOTE — PROGRESS NOTES
Pulse:  [] 89  Resp:  [12-16] 15  SpO2:  [97 %-100 %] 100 %  BP: (106-135)/(55-74) 114/63        Chente Armstrong MD  PGY 4 OB/GYN  [phone]

## 2018-01-04 NOTE — PROCEDURES
Radiology Post-Procedure Note    Pre Op Diagnosis: Placenta previa and percreta    Post Op Diagnosis: same    Procedure: Uterine artery embolization    Procedure performed by: Jonathan Barnett MD    Written Informed Consent Obtained: Yes    Specimen Removed: NO    Estimated Blood Loss: less than 50     Findings: Local anesthesia and moderate sedation were used.    The right femoral artery was cannulated with a 5-Emirati sheath.  An arterial catheter was introduced and pelvic angiography performed using iodinated contrast for anatomic localization purposes.  There were hypertrophied uterine arteries bilaterally. The arteries were subselected with the microcatheter and embolization was performed using gelfoam slurry. There was persistent antegrade flow on follow up angiograms with significantly decreased hypervascularity. Please see full dictated report for further details.    The catheter was removed and hemostasis was achieved using manual pressure without hematoma. The patient tolerated the procedure well and there were no complications.  Please see Imaging report for further details.    Jonathan Barnett MD  Department of Radiology  Pager: [phone]

## 2018-01-04 NOTE — ASSESSMENT & PLAN NOTE
- Vanc added to surgical ppx antibiotics  - Will place Mupirocin in nasal nares prior to procedure

## 2018-01-04 NOTE — HPI
Parth Latif is a 32 y.o.  female with IUP at 34w2d gestation who presents for scheduled  section with pregnancy complicated by complete placenta previa and placenta percreta. There is high suspicion that there may be bladder involvement. She is doing well this morning. Patient denies contractions and vaginal bleeding at this time. She reports normal fetal movement. She is tolerating a regular diet and denies nausea and vomiting. She has met with IR as patient is also scheduled for immediate bilateral. She has a medical history of upper thigh abscess that was positive for MRSA during her last pregnancy. She has a  section x2.

## 2018-01-04 NOTE — L&D DELIVERY NOTE
[facility]   Section   Operative Note    SUMMARY      Section Operative Note  Procedure Date: 2018    Procedure:   1. Classical  Section via Vertical skin incision    Indications:  1. Complete Placenta Previa  2. Placental Percreta     Pre-operative Diagnosis:   1. IUP at 34 week 2 day pregnancy  2. Complete Placenta Previa  3. Placental Percreta  4. History of  Section x2  5. History of MRSA Infection    Post-operative Diagnosis: same    Surgeon: Mike Eric MD     Assistant: Lamont Bowens MD    Assistant: Chente Armstrong MD    Anesthesia: Combined Spinal and Epidural    Findings:   1. Male fetus in cephalic presentation but delivered via breech extraction secondary to classical incision  2. 3 vessel cord that once detached from the  was shortened that was ligated with chromic suture then cut. The excess cord was sent to pathology  3. Placenta was left in situ attached within the uterus but the excess membranes were trimmed using Champion scissors  4. The hysterotomy was closed in a 2-layer running locking closure using chromic suture  5. The extent of the placenta invasion was carefully examined. There is extension into the right parametrium and evidence of a placenta involvement of the vesicouterine peritoneum and likely percreta.    Estimated Blood Loss:  200 mL           Total IV Fluids: 1000 mL     UOP: 150 mL    Specimens:   1. Single viable male infant, Placenta was left in situ    PreOp CBC:   Lab Results   Component Value Date    WBC 14.83 (H) 2018    HGB 11.6 (L) 2018    HCT 36.2 (L) 2018    MCV 88 2018     2018                     Complications:  None; patient tolerated the procedure well.           Disposition: Stable, To Interventional Radiology for UAE           Condition: stable    Chente Armstrong MD  PGY 4 OB/GYN  [phone]          Delivery Information for  Sebas Alba  Salomon    Birth information:  YOB: 2017   Time of birth: 10:03 AM   Sex: male   Head Delivery Date/Time: 2018 10:03 AM   Delivery type: , Classical   Gestational Age: 34w2d    Delivery Providers    Delivering clinician:  Mike Eric MD   Other personnel:   Provider Role   MD Chente Reed MD Jennifer J Moran, RN    Trista Hester, NNP    Sunshine Landry, CST    MD Annel Vizcarra MD                     Assessment    No data filed          Assisted Delivery Details:    Forceps attempted?:  No  Vacuum extractor attempted?:  No         Shoulder Dystocia    Shoulder dystocia present?:  No           Presentation and Position    Presentation:   cepahlic   Position:                   Interventions/Resuscitation    Method:  NICU Attended       Cord    Vessels:  3 vessels  Complications:  None  Delayed Cord Clamping?:  No  Gases Sent?:  No  Stem Cell Collection (by MD):  No             Labor Events:       labor: No     Labor Onset Date/Time:         Dilation Complete Date/Time:         Start Pushing Date/Time:       Rupture Date/Time:              Rupture type: intact         Fluid Amount:        Fluid Color:        Fluid Odor:        Membrane Status (PeriCalm):        Rupture Date/Time (PeriCalm):        Fluid Amount (PeriCalm):        Fluid Color (PeriCalm):         steroids: Full Course     Antibiotics given for GBS: No     Induction:       Indications for induction:        Augmentation:       Indications for augmentation:       Labor complications:       Additional complications: Placenta Percreta        Cervical ripening:                     Delivery:      Episiotomy: None     Indication for Episiotomy:       Perineal Lacerations: None Repaired:      Periurethral Laceration: none Repaired:     Labial Laceration: none Repaired:     Sulcus Laceration: none Repaired:     Vaginal Laceration: No Repaired:      Cervical Laceration: No Repaired:     Repair suture:       Repair # of packets:       Vaginal delivery QBL (mL): 0      QBL (mL): 200     Combined Blood Loss (mL): 200     Vaginal Sweep Performed: Yes     Surgicount Correct: Yes       Other providers:       Anesthesia    Method:  Spinal, Epidural          Details (if applicable):  Trial of Labor No    Categorization: Repeat    Priority: Routine   Indications for : Other (Add Comments); placenta accreta   Incision Type: classical     Additional  information:  Forceps:    Vacuum:    Breech:    Observed anomalies    Other (Comments):         I have reviewed the notes, assessments, and/or procedures documented above and concur.

## 2018-01-04 NOTE — SUBJECTIVE & OBJECTIVE
Obstetric History       T2      L2     SAB0   TAB0   Ectopic0   Multiple0   Live Births2       # Outcome Date GA Lbr Anant/2nd Weight Sex Delivery Anes PTL Lv   3 Current            2 Term  39w0d  3.629 kg (8 lb) F CS-LTranv  N HERO      Name: Gray   1 Term  39w0d  3.487 kg (7 lb 11 oz) M CS-LTranv  N HERO      Name: Bowie      Complications: Breech presentation, antepartum        Past Medical History:   Diagnosis Date    MRSA (methicillin resistant staph aureus) culture positive      Past Surgical History:   Procedure Laterality Date     SECTION      x2     MANDIBLE SURGERY      TONSILLECTOMY         PTA Medications   Medication Sig    PNV,CALCIUM 72/IRON/FOLIC ACID (PRENATAL VITAMIN) Tab Take 1 tablet by mouth once daily.       Review of patient's allergies indicates:   Allergen Reactions    Pcn [penicillins] Hives        Family History     Problem Relation (Age of Onset)    Hypertension Mother    No Known Problems Father        Social History Main Topics    Smoking status: Never Smoker    Smokeless tobacco: Never Used    Alcohol use No    Drug use: No    Sexual activity: Yes     Partners: Male     Birth control/ protection: IUD     Review of Systems   Constitutional: Negative for activity change, appetite change and fever.   Respiratory: Negative for shortness of breath.    Cardiovascular: Negative for chest pain and palpitations.   Gastrointestinal: Negative for abdominal pain, blood in stool, nausea and vomiting.   Endocrine: Negative for diabetes.   Genitourinary: Negative for hematuria, vaginal bleeding, vaginal discharge and vaginal odor.   Neurological: Negative for headaches.   Psychiatric/Behavioral: Negative for depression.      Objective:     Vital Signs (Most Recent):  Pulse: (!) 121 (18 0755)  BP: 135/74 (18 0750)  SpO2: 99 % (18 0755) Vital Signs (24h Range):  Pulse:  [114-121] 121  SpO2:  [99 %] 99 %  BP: (120-135)/(74-75) 135/74      Weight: 106 kg (233 lb 11 oz)  Body mass index is 42.74 kg/m².    FHT: Cat 1 (reassuring)  130bpm, moderate BTBV, +acclerations, no deceleration  TOCO: irritability with one contraction in 20 minutes    Physical Exam:   Constitutional: She is oriented to person, place, and time. She appears well-developed and well-nourished.       Cardiovascular: Normal rate.          Abdominal: Soft. There is no tenderness.   35 week gravid uterus but no abdominal pain with gentle palpation                 Neurological: She is alert and oriented to person, place, and time.    Skin: Skin is warm and dry.    Psychiatric: She has a normal mood and affect. Her behavior is normal.       Cervix: deferred      Significant Labs:  Lab Results   Component Value Date    [facility] 01/[facility]    HEPBSAG Non-reactive 06/14/2017       CBC:   Recent Labs  Lab 01/03/18  1245   WBC 16.68*   RBC 4.06   HGB 11.5*   HCT 36.0*      MCV 89   MCH 28.3   MCHC 31.9*     CMP:   Recent Labs  Lab 01/03/18  1245   *   CALCIUM 9.5      K 4.1   CO2 26      BUN 9   CREATININE 0.6     I have personallly reviewed all pertinent lab results from the last 24 hours.

## 2018-01-04 NOTE — ANESTHESIA PROCEDURE NOTES
CSE    Patient location during procedure: OR  Start time: 1/4/2018 9:38 AM  Timeout: 1/4/2018 9:37 AM  End time: 1/4/2018 9:42 AM  Staffing  Anesthesiologist: KIERRA TRUONG  Resident/CRNA: PREET GARNER  Performed: resident/CRNA   Preanesthetic Checklist  Completed: patient identified, site marked, surgical consent, pre-op evaluation, timeout performed, IV checked, risks and benefits discussed and monitors and equipment checked  CSE  Patient position: sitting  Prep: ChloraPrep  Patient monitoring: heart rate, continuous pulse ox and frequent blood pressure checks  Approach: midline  Spinal Needle  Needle type: Vineet   Needle gauge: 25 G  Needle length: 5 in  Epidural Needle  Injection technique: PACO saline  Needle type: Tuohy   Needle gauge: 17 G  Needle length: 3.5 in  Needle insertion depth: 7 cm  Location: L3-4  Needle localization: anatomical landmarks  Catheter  Catheter type: DreamNotes  Catheter size: 19 G  Catheter at skin depth: 11 cm  Additional Documentation: negative aspiration for CSF and negative aspiration for heme  Assessment  Sensory level: T4   Dermatomal levels determined by pinch or prick  Intrathecal Medications:  Bolus administered: 1.6 mL of 0.75 and with dextrose bupivacaine  administered: primary anesthetic mcg of  fentanyl and morphine (fentanyl 10 mcg, duramorph 150 mcg)

## 2018-01-05 LAB
ANISOCYTOSIS BLD QL SMEAR: SLIGHT
BASOPHILS # BLD AUTO: 0.01 K/UL
BASOPHILS NFR BLD: 0.1 %
BLD PROD TYP BPU: NORMAL
BLD PROD TYP BPU: NORMAL
BLOOD UNIT EXPIRATION DATE: NORMAL
BLOOD UNIT EXPIRATION DATE: NORMAL
BLOOD UNIT TYPE CODE: 8400
BLOOD UNIT TYPE CODE: 8400
BLOOD UNIT TYPE: NORMAL
BLOOD UNIT TYPE: NORMAL
CODING SYSTEM: NORMAL
CODING SYSTEM: NORMAL
DIFFERENTIAL METHOD: ABNORMAL
DISPENSE STATUS: NORMAL
DISPENSE STATUS: NORMAL
EOSINOPHIL # BLD AUTO: 0.1 K/UL
EOSINOPHIL NFR BLD: 0.6 %
ERYTHROCYTE [DISTWIDTH] IN BLOOD BY AUTOMATED COUNT: 14.3 %
GIANT PLATELETS BLD QL SMEAR: PRESENT
HCT VFR BLD AUTO: 29.3 %
HGB BLD-MCNC: 9.6 G/DL
HYPOCHROMIA BLD QL SMEAR: ABNORMAL
LYMPHOCYTES # BLD AUTO: 1.3 K/UL
LYMPHOCYTES NFR BLD: 10.3 %
MCH RBC QN AUTO: 28.7 PG
MCHC RBC AUTO-ENTMCNC: 32.8 G/DL
MCV RBC AUTO: 88 FL
MONOCYTES # BLD AUTO: 1.1 K/UL
MONOCYTES NFR BLD: 9.1 %
NEUTROPHILS # BLD AUTO: 9.9 K/UL
NEUTROPHILS NFR BLD: 79.9 %
NUM UNITS TRANS FFP: NORMAL
NUM UNITS TRANS FFP: NORMAL
OVALOCYTES BLD QL SMEAR: ABNORMAL
PLATELET # BLD AUTO: 156 K/UL
PLATELET BLD QL SMEAR: ABNORMAL
PMV BLD AUTO: 9.7 FL
POIKILOCYTOSIS BLD QL SMEAR: SLIGHT
POLYCHROMASIA BLD QL SMEAR: ABNORMAL
RBC # BLD AUTO: 3.35 M/UL
STOMATOCYTES BLD QL SMEAR: PRESENT
WBC # BLD AUTO: 12.48 K/UL

## 2018-01-05 PROCEDURE — 25000003 PHARM REV CODE 250: Performed by: ANESTHESIOLOGY

## 2018-01-05 PROCEDURE — 63600175 PHARM REV CODE 636 W HCPCS: Performed by: ANESTHESIOLOGY

## 2018-01-05 PROCEDURE — 85025 COMPLETE CBC W/AUTO DIFF WBC: CPT

## 2018-01-05 PROCEDURE — 25000003 PHARM REV CODE 250: Performed by: OBSTETRICS & GYNECOLOGY

## 2018-01-05 PROCEDURE — 99231 SBSQ HOSP IP/OBS SF/LOW 25: CPT | Mod: ,,, | Performed by: OBSTETRICS & GYNECOLOGY

## 2018-01-05 PROCEDURE — 11000001 HC ACUTE MED/SURG PRIVATE ROOM

## 2018-01-05 RX ORDER — OXYCODONE AND ACETAMINOPHEN 10; 325 MG/1; MG/1
1 TABLET ORAL EVERY 4 HOURS PRN
Status: DISCONTINUED | OUTPATIENT
Start: 2018-01-05 | End: 2018-01-08 | Stop reason: HOSPADM

## 2018-01-05 RX ORDER — OXYCODONE AND ACETAMINOPHEN 5; 325 MG/1; MG/1
1 TABLET ORAL EVERY 4 HOURS PRN
Status: DISCONTINUED | OUTPATIENT
Start: 2018-01-05 | End: 2018-01-08 | Stop reason: HOSPADM

## 2018-01-05 RX ORDER — IBUPROFEN 600 MG/1
600 TABLET ORAL EVERY 6 HOURS
Status: DISCONTINUED | OUTPATIENT
Start: 2018-01-05 | End: 2018-01-08 | Stop reason: HOSPADM

## 2018-01-05 RX ADMIN — SIMETHICONE CHEW TAB 80 MG 80 MG: 80 TABLET ORAL at 11:01

## 2018-01-05 RX ADMIN — ONDANSETRON 8 MG: 8 TABLET, ORALLY DISINTEGRATING ORAL at 05:01

## 2018-01-05 RX ADMIN — OXYCODONE HYDROCHLORIDE AND ACETAMINOPHEN 1 TABLET: 10; 325 TABLET ORAL at 05:01

## 2018-01-05 RX ADMIN — SIMETHICONE CHEW TAB 80 MG 80 MG: 80 TABLET ORAL at 05:01

## 2018-01-05 RX ADMIN — KETOROLAC TROMETHAMINE 30 MG: 30 INJECTION, SOLUTION INTRAMUSCULAR at 12:01

## 2018-01-05 RX ADMIN — OXYCODONE HYDROCHLORIDE AND ACETAMINOPHEN 1 TABLET: 10; 325 TABLET ORAL at 09:01

## 2018-01-05 RX ADMIN — MUPIROCIN 1 G: 20 OINTMENT TOPICAL at 09:01

## 2018-01-05 RX ADMIN — SIMETHICONE CHEW TAB 80 MG 80 MG: 80 TABLET ORAL at 09:01

## 2018-01-05 RX ADMIN — IBUPROFEN 600 MG: 600 TABLET, FILM COATED ORAL at 02:01

## 2018-01-05 RX ADMIN — IBUPROFEN 600 MG: 600 TABLET, FILM COATED ORAL at 08:01

## 2018-01-05 RX ADMIN — MUPIROCIN 1 G: 20 OINTMENT TOPICAL at 08:01

## 2018-01-05 RX ADMIN — ACETAMINOPHEN 650 MG: 325 TABLET ORAL at 06:01

## 2018-01-05 RX ADMIN — ACETAMINOPHEN 650 MG: 325 TABLET ORAL at 12:01

## 2018-01-05 RX ADMIN — STANDARDIZED SENNA CONCENTRATE AND DOCUSATE SODIUM 1 TABLET: 8.6; 5 TABLET, FILM COATED ORAL at 09:01

## 2018-01-05 RX ADMIN — OXYCODONE HYDROCHLORIDE AND ACETAMINOPHEN 1 TABLET: 10; 325 TABLET ORAL at 01:01

## 2018-01-05 RX ADMIN — DOCUSATE SODIUM 200 MG: 100 CAPSULE, LIQUID FILLED ORAL at 08:01

## 2018-01-05 RX ADMIN — KETOROLAC TROMETHAMINE 30 MG: 30 INJECTION, SOLUTION INTRAMUSCULAR at 06:01

## 2018-01-05 RX ADMIN — DOCUSATE SODIUM 200 MG: 100 CAPSULE, LIQUID FILLED ORAL at 09:01

## 2018-01-05 NOTE — PHYSICIAN QUERY
"PT Name: Parth Latif  MR #: [id number]     Physician Query Form - Documentation Clarification      CDS/: [email or web address]           Contact information:[email or web address]    This form is a permanent document in the medical record.     Query Date: 2018    By submitting this query, we are merely seeking further clarification of documentation. Please utilize your independent clinical judgment when addressing the question(s) below.    The Medical record reflects the following:    Supporting Clinical Findings Location in Medical Record   BMI=42.8  Ht=5' 2" (1.575 m)  Zm=847 kg (233 lb 11 oz)    Procedure: Classical  Section via vertical skin incision    Anthropometrics         Op note                                                                                       Doctor, Please specify diagnosis or diagnoses associated with above clinical findings.    Provider Use Only      [  ] Morbid obesity complicating childbirth  [x] Obesity complicating childbirth  [  ] Other, please specify:_______________________________________                                                                                                                   [  ] Clinically undetermined        Chente Armstrong MD  PGY 4 OB/GYN  [phone]      "

## 2018-01-05 NOTE — HOSPITAL COURSE
POD#1: Patient is now s/p Classical  and UAE. Patient is currently in the ICU doing well. Tolerating clear liquids. Pain well controlled with PCA  POD#2: S/p PCA. Patient doing well and meeting postoperative goals. Pain controlled.  POD#3: Doing well. Pain controlled oral meds. Ambulating. Voiding spontaneously. CBC collected today and stable 9., Coags pending.   2018 - POD # 4. Doing well. Pain controlled. Ambulating. Voiding spontaneously. Has had BM. CBC stable 9., coags overall normal fibrinogen and PT/INR. APTT slightly elevated at 32.3

## 2018-01-05 NOTE — PLAN OF CARE
Problem: Patient Care Overview  Goal: Plan of Care Review  Outcome: Ongoing (interventions implemented as appropriate)  Pt resting comfortably. VSS. PCA pump started per MD order. Pt tolerating well. Family at bedside. [place] set up on hospital computer. Q 2 hour rounds performed. Safety maintained.

## 2018-01-05 NOTE — ASSESSMENT & PLAN NOTE
- Currently still in ICU, doing well  - CBC this morning  - Liquid diet, will advance to regular diet  - PCA for pain control   Will transition to PO medication  - UOP adequate and without hematuria  - Bandage in place, will remove this afternoon  - Male infant in the NICU

## 2018-01-05 NOTE — PROGRESS NOTES
[facility]  Obstetrics  Postpartum Progress Note    Patient Name: Parth Latif  MRN: [id number]  Admission Date: 2018  Hospital Length of Stay: 1 days  Attending Physician: Mike Eric MD  Primary Care Provider: Nils Barnes MD    Subjective:     Principal Problem:Placenta percreta    Hospital course: POD#1: Patient is now s/p Classical  and UAE. Patient is currently in the ICU doing well. Tolerating clear liquids. Pain well controlled with PCA    Interval History:   Ms. Latif is a now POD#1 s/p Classical  and UAE. Patient is currently in the ICU doing well. She is tolerating a liquid diet which included: juice, eater and sorbet without nausea or vomiting. She has a viera catheter in place that is draining clear urine. She is not ambulating yet and has to decided that is safer not to breastfeeding. She has not passed flatus yet. She denies calf tenderness. Her pain is well controlled on PCA.   Objective:     Vital Signs (Most Recent):  Temp: 98.6 °F (37 °C) (18 0345)  Pulse: (!) 112 (18 0545)  Resp: (!) 25 (1845)  BP: (!) 110/58 (1845)  SpO2: 99 % (18 0545) Vital Signs (24h Range):  Temp:  [98.2 °F (36.8 °C)-99.1 °F (37.3 °C)] 98.6 °F (37 °C)  Pulse:  [] 112  Resp:  [12-25] 25  SpO2:  [93 %-100 %] 99 %  BP: (101-162)/(52-96) 110/58  Arterial Line BP: (114-134)/(62-66) 114/64     Weight: 106 kg (233 lb 11 oz)  Body mass index is 42.74 kg/m².      Intake/Output Summary (Last 24 hours) at 18 0644  Last data filed at 18 0600   Gross per 24 hour   Intake          1504.83 ml   Output             2800 ml   Net         -1295.17 ml       Significant Labs:  Lab Results   Component Value Date    GROUPTRH B POS 2018    HEPBSAG Non-reactive 2017       Recent Labs  Lab 18  0816   HGB 11.6*   HCT 36.2*       CBC:   Recent Labs  Lab 18  0816   WBC 14.83*   RBC 4.11   HGB 11.6*   HCT 36.2*       MCV 88   MCH 28.2   MCHC 32.0     CMP:   Recent Labs  Lab 18  1245   *   CALCIUM 9.5      K 4.1   CO2 26      BUN 9   CREATININE 0.6     I have personallly reviewed all pertinent lab results from the last 24 hours.    Physical Exam:   Constitutional: She is oriented to person, place, and time. She appears well-developed and well-nourished.       Cardiovascular: Regular rhythm.    Mildly tachycardiac  Pulse Score: 2+   Pulmonary/Chest: Effort normal and breath sounds normal. No respiratory distress.        Abdominal: Soft. Bowel sounds are normal. She exhibits distension and abdominal incision. There is no tenderness.   Normoactive bowel sounds, moderately distended but soft. Vertical skin incision is with bandage in place, minimal amount of old blood on the bandage             Musculoskeletal: Normal range of motion.       Neurological: She is alert and oriented to person, place, and time. She has normal reflexes.    Skin: Skin is warm and dry.    Psychiatric: She has a normal mood and affect.       Assessment/Plan:     32 y.o. female  for:    * Placenta percreta    - s/p classical  section  - s/p UAE  - Placenta left in situ        Status post classical  delivery    - Currently still in ICU, doing well  - CBC this morning  - Liquid diet, will advance to regular diet  - PCA for pain control   Will transition to PO medication  - UOP adequate and without hematuria  - Bandage in place, will remove this afternoon  - Male infant in the NICU        History of MRSA infection    - no issues at this time  - s/p Mupirocin in nasal nares prior to procedure            Disposition: As patient meets milestones, will plan to discharge once meeting all appropriate milestones.    Chente Armstrong MD  Obstetrics  [facility]

## 2018-01-05 NOTE — ANESTHESIA POSTPROCEDURE EVALUATION
"Anesthesia Post Evaluation    Patient: Parth Latif    Procedure(s) Performed: Procedure(s) (LRB):  DELIVERY- SECTION (N/A)    Final Anesthesia Type: CSE  Patient location during evaluation: floor  Patient participation: Yes- Able to Participate  Level of consciousness: awake and alert and oriented  Post-procedure vital signs: reviewed and stable  Pain management: adequate  Airway patency: patent  PONV status at discharge: No PONV  Anesthetic complications: no      Cardiovascular status: blood pressure returned to baseline and hemodynamically stable  Respiratory status: unassisted, spontaneous ventilation and room air  Hydration status: euvolemic  Follow-up not needed.        Visit Vitals  BP (!) 115/54 (BP Location: Left arm, Patient Position: Lying)   Pulse 95   Temp 36.4 °C (97.5 °F) (Temporal)   Resp 16   Ht 5' 2" (1.575 m)   Wt 106 kg (233 lb 11 oz)   SpO2 100%   Breastfeeding? Unknown   BMI 42.74 kg/m²       Pain/Alexandrea Score: Pain Assessment Performed: Yes (2018  7:15 AM)  Presence of Pain: denies (2018  7:15 AM)  Pain Rating Prior to Med Admin: 6 (2018  2:29 PM)  Pain Rating Post Med Admin: 3 (2018  7:48 AM)      "

## 2018-01-05 NOTE — SUBJECTIVE & OBJECTIVE
Hospital course: POD#1: Patient is now s/p Classical  and UAE. Patient is currently in the ICU doing well. Tolerating clear liquids. Pain well controlled with PCA    Interval History:   Ms. Latif is a now POD#1 s/p Classical  and UAE. Patient is currently in the ICU doing well. She is tolerating a liquid diet which included: juice, eater and sorbet without nausea or vomiting. She has a viera catheter in place that is draining clear urine. She is not ambulating yet and has to decided that is safer not to breastfeeding. She has not passed flatus yet. She denies calf tenderness. Her pain is well controlled on PCA.   Objective:     Vital Signs (Most Recent):  Temp: 98.6 °F (37 °C) (18 0345)  Pulse: (!) 112 (18)  Resp: (!) 25 (18)  BP: (!) 110/58 (18)  SpO2: 99 % (18) Vital Signs (24h Range):  Temp:  [98.2 °F (36.8 °C)-99.1 °F (37.3 °C)] 98.6 °F (37 °C)  Pulse:  [] 112  Resp:  [12-25] 25  SpO2:  [93 %-100 %] 99 %  BP: (101-162)/(52-96) 110/58  Arterial Line BP: (114-134)/(62-66) 114/64     Weight: 106 kg (233 lb 11 oz)  Body mass index is 42.74 kg/m².      Intake/Output Summary (Last 24 hours) at 18 0644  Last data filed at 18 0600   Gross per 24 hour   Intake          1504.83 ml   Output             2800 ml   Net         -1295.17 ml       Significant Labs:  Lab Results   Component Value Date    GROUPTRH B POS 2018    HEPBSAG Non-reactive 2017       Recent Labs  Lab 18  0816   HGB 11.6*   HCT 36.2*       CBC:   Recent Labs  Lab 18  0816   WBC 14.83*   RBC 4.11   HGB 11.6*   HCT 36.2*      MCV 88   MCH 28.2   MCHC 32.0     CMP:   Recent Labs  Lab 18  1245   *   CALCIUM 9.5      K 4.1   CO2 26      BUN 9   CREATININE 0.6     I have personallly reviewed all pertinent lab results from the last 24 hours.    Physical Exam:   Constitutional: She is oriented to person, place, and time.  She appears well-developed and well-nourished.       Cardiovascular: Regular rhythm.    Mildly tachycardiac  Pulse Score: 2+   Pulmonary/Chest: Effort normal and breath sounds normal. No respiratory distress.        Abdominal: Soft. Bowel sounds are normal. She exhibits distension and abdominal incision. There is no tenderness.   Normoactive bowel sounds, moderately distended but soft. Vertical skin incision is with bandage in place, minimal amount of old blood on the bandage             Musculoskeletal: Normal range of motion.       Neurological: She is alert and oriented to person, place, and time. She has normal reflexes.    Skin: Skin is warm and dry.    Psychiatric: She has a normal mood and affect.

## 2018-01-05 NOTE — PROGRESS NOTES
Patient seen at bedside.  Reports mild to moderate pain well controlled with current pain regimen. Denies noting vaginal bleeding. Patient reports tolerating sips of water and Sprite with no N/V. Reports nausea with PO medication. Denies flatus/BM. Lundberg catheter in place. Patient has yet to ambulate. Patient is without complaint.    Temp:  [98.2 °F (36.8 °C)-98.9 °F (37.2 °C)] 98.9 °F (37.2 °C)  Pulse:  [] 110  Resp:  [12-22] 17  SpO2:  [93 %-100 %] 95 %  BP: (103-162)/(55-96) 114/63  Arterial Line BP: (114-134)/(62-66) 114/64    UOP 45-75cc/h    Patient in no acute distress  Abdomen soft, moderately distended, nontender  Vertical midline bandage in place with mild shadowing    Continue current plan of care    Sridevi Bennett MD  PGY-3 OB/GYN  [phone]

## 2018-01-06 PROCEDURE — 99231 SBSQ HOSP IP/OBS SF/LOW 25: CPT | Mod: ,,, | Performed by: OBSTETRICS & GYNECOLOGY

## 2018-01-06 PROCEDURE — 25000003 PHARM REV CODE 250: Performed by: OBSTETRICS & GYNECOLOGY

## 2018-01-06 PROCEDURE — 11000001 HC ACUTE MED/SURG PRIVATE ROOM

## 2018-01-06 RX ADMIN — OXYCODONE HYDROCHLORIDE AND ACETAMINOPHEN 1 TABLET: 10; 325 TABLET ORAL at 05:01

## 2018-01-06 RX ADMIN — DOCUSATE SODIUM 200 MG: 100 CAPSULE, LIQUID FILLED ORAL at 08:01

## 2018-01-06 RX ADMIN — MUPIROCIN 1 G: 20 OINTMENT TOPICAL at 09:01

## 2018-01-06 RX ADMIN — ONDANSETRON 8 MG: 8 TABLET, ORALLY DISINTEGRATING ORAL at 08:01

## 2018-01-06 RX ADMIN — OXYCODONE HYDROCHLORIDE AND ACETAMINOPHEN 1 TABLET: 5; 325 TABLET ORAL at 04:01

## 2018-01-06 RX ADMIN — MUPIROCIN 1 G: 20 OINTMENT TOPICAL at 08:01

## 2018-01-06 RX ADMIN — SIMETHICONE CHEW TAB 80 MG 80 MG: 80 TABLET ORAL at 05:01

## 2018-01-06 RX ADMIN — IBUPROFEN 600 MG: 600 TABLET, FILM COATED ORAL at 02:01

## 2018-01-06 RX ADMIN — ONDANSETRON 8 MG: 8 TABLET, ORALLY DISINTEGRATING ORAL at 10:01

## 2018-01-06 RX ADMIN — ONDANSETRON 8 MG: 8 TABLET, ORALLY DISINTEGRATING ORAL at 01:01

## 2018-01-06 RX ADMIN — OXYCODONE HYDROCHLORIDE AND ACETAMINOPHEN 1 TABLET: 10; 325 TABLET ORAL at 08:01

## 2018-01-06 RX ADMIN — IBUPROFEN 600 MG: 600 TABLET, FILM COATED ORAL at 03:01

## 2018-01-06 RX ADMIN — OXYCODONE HYDROCHLORIDE AND ACETAMINOPHEN 1 TABLET: 10; 325 TABLET ORAL at 01:01

## 2018-01-06 RX ADMIN — IBUPROFEN 600 MG: 600 TABLET, FILM COATED ORAL at 08:01

## 2018-01-06 RX ADMIN — STANDARDIZED SENNA CONCENTRATE AND DOCUSATE SODIUM 1 TABLET: 8.6; 5 TABLET, FILM COATED ORAL at 08:01

## 2018-01-06 RX ADMIN — SIMETHICONE CHEW TAB 80 MG 80 MG: 80 TABLET ORAL at 01:01

## 2018-01-06 RX ADMIN — OXYCODONE HYDROCHLORIDE AND ACETAMINOPHEN 1 TABLET: 5; 325 TABLET ORAL at 10:01

## 2018-01-06 NOTE — PROGRESS NOTES
[facility]  Obstetrics  Postpartum Progress Note    Patient Name: Parth Latif  MRN: [id number]  Admission Date: 2018  Hospital Length of Stay: 2 days  Attending Physician: Mike Eric MD  Primary Care Provider: Nils Barnes MD    Subjective:     Principal Problem:Placenta percreta    Hospital course: POD#1: Patient is now s/p Classical  and UAE. Patient is currently in the ICU doing well. Tolerating clear liquids. Pain well controlled with PCA  POD#2: S/p PCA. Patient doing well and meeting postoperative goals. Pain controlled.    Interval History:   Ms. Latif is a now POD#2 s/p Classical  and UAE. She is tolerating a regular diet without nausea or vomiting. She is voiding and ambulating without difficulty. She has passed flatus but has not had a bowel movement. She denies calf tenderness. Her pain is well controlled.    Objective:     Vital Signs (Most Recent):  Temp: 96.6 °F (35.9 °C) (18 0150)  Pulse: 88 (18 0150)  Resp: 16 (18 0150)  BP: 123/60 (18 0150)  SpO2: 95 % (18 0150) Vital Signs (24h Range):  Temp:  [96.6 °F (35.9 °C)-98.4 °F (36.9 °C)] 96.6 °F (35.9 °C)  Pulse:  [] 88  Resp:  [15-18] 16  SpO2:  [93 %-100 %] 95 %  BP: (113-127)/(54-62) 123/60     Weight: 106 kg (233 lb 11 oz)  Body mass index is 42.74 kg/m².      Intake/Output Summary (Last 24 hours) at 18 0548  Last data filed at 18 1800   Gross per 24 hour   Intake          2718.83 ml   Output             2215 ml   Net           503.83 ml       Significant Labs:  Lab Results   Component Value Date    GROUPTRH B POS 2018    HEPBSAG Non-reactive 2017       Recent Labs  Lab 18  0700   HGB 9.6*   HCT 29.3*       CBC:     Recent Labs  Lab 18  0700   WBC 12.48   RBC 3.35*   HGB 9.6*   HCT 29.3*      MCV 88   MCH 28.7   MCHC 32.8     CMP: No results for input(s): GLU, CALCIUM, ALBUMIN, PROT, NA, K, CO2, CL, BUN, CREATININE,  ALKPHOS, ALT, AST, BILITOT in the last 48 hours.  I have personallly reviewed all pertinent lab results from the last 24 hours.    Physical Exam:   Constitutional: She is oriented to person, place, and time. She appears well-developed and well-nourished.       Cardiovascular: Regular rhythm.    Mildly tachycardiac  Pulse Score: 2+   Pulmonary/Chest: Effort normal and breath sounds normal. No respiratory distress.        Abdominal: Soft. Bowel sounds are normal. She exhibits abdominal incision. She exhibits no distension. There is no tenderness.   Vertical skin incision is with bandage in place, minimal amount of old blood on the bandage             Musculoskeletal: Normal range of motion.       Neurological: She is alert and oriented to person, place, and time. She has normal reflexes.    Skin: Skin is warm and dry.    Psychiatric: She has a normal mood and affect.       Assessment/Plan:     32 y.o. female  for:    * Placenta percreta    - s/p classical  section  - s/p UAE  - Placenta left in situ        Status post classical  delivery    - H/h 11.5/60 preop > 9.6/29.3 post op  - Regular diet  - Pain well controlled on PO meds  - UOP adequate and without hematuria  - Male infant in the NICU        History of MRSA infection    - no issues at this time  - s/p Mupirocin in nasal nares prior to procedure            Disposition: As patient meets milestones, will plan to discharge POD #5-6.    Elias Blancas MD  Obstetrics  [facility]

## 2018-01-06 NOTE — NURSING
Pt arrived back from NICU. States she was able to do skin to skin with baby. No apparent distress noted.

## 2018-01-06 NOTE — NURSING
Pt to NICU with . States she does not want to take any pain medicine until she comes back from the NICU. No apparent distress noted.

## 2018-01-06 NOTE — PLAN OF CARE
Problem: Patient Care Overview  Goal: Plan of Care Review  Outcome: Ongoing (interventions implemented as appropriate)  VSS. Afebrile. Vertical incision c,d,i. Maintained fundal precautions, abdominal binder in place, no vaginal bleeding present. Pain controlled with PO medications during shift. Ambulates to the bathroom, voiding without difficulty. No BM reported. Plan of care reviewed with patient and . Pt verbalized understanding. All questions answered. Will continue to monitor.

## 2018-01-06 NOTE — NURSING
Pt returned from NICU. States she is having pain 8/10. Ibuprofen given. Percocet offered. Pt states she wants to wait and see if the ibuprofen will help first. Pt to rest; in no acute distress at this time.

## 2018-01-06 NOTE — NURSING
Pt in wheelchair pushed by  to visit in NICU. States moderate pain relief obtained after pain med. No apparent distress noted.

## 2018-01-06 NOTE — SUBJECTIVE & OBJECTIVE
Hospital course: POD#1: Patient is now s/p Classical  and UAE. Patient is currently in the ICU doing well. Tolerating clear liquids. Pain well controlled with PCA  POD#2: S/p PCA. Patient doing well and meeting postoperative goals. Pain controlled.    Interval History:   Ms. Latif is a now POD#2 s/p Classical  and UAE. She is tolerating a regular diet without nausea or vomiting. She is voiding and ambulating without difficulty. She has passed flatus but has not had a bowel movement. She denies calf tenderness. Her pain is well controlled.    Objective:     Vital Signs (Most Recent):  Temp: 96.6 °F (35.9 °C) (18 015)  Pulse: 88 (18)  Resp: 16 (18)  BP: 123/60 (18)  SpO2: 95 % (18) Vital Signs (24h Range):  Temp:  [96.6 °F (35.9 °C)-98.4 °F (36.9 °C)] 96.6 °F (35.9 °C)  Pulse:  [] 88  Resp:  [15-18] 16  SpO2:  [93 %-100 %] 95 %  BP: (113-127)/(54-62) 123/60     Weight: 106 kg (233 lb 11 oz)  Body mass index is 42.74 kg/m².      Intake/Output Summary (Last 24 hours) at 18 0548  Last data filed at 18 1800   Gross per 24 hour   Intake          2718.83 ml   Output             2215 ml   Net           503.83 ml       Significant Labs:  Lab Results   Component Value Date    GROUPTRH B POS 2018    HEPBSAG Non-reactive 2017       Recent Labs  Lab 18  0700   HGB 9.6*   HCT 29.3*       CBC:     Recent Labs  Lab 18  0700   WBC 12.48   RBC 3.35*   HGB 9.6*   HCT 29.3*      MCV 88   MCH 28.7   MCHC 32.8     CMP: No results for input(s): GLU, CALCIUM, ALBUMIN, PROT, NA, K, CO2, CL, BUN, CREATININE, ALKPHOS, ALT, AST, BILITOT in the last 48 hours.  I have personallly reviewed all pertinent lab results from the last 24 hours.    Physical Exam:   Constitutional: She is oriented to person, place, and time. She appears well-developed and well-nourished.       Cardiovascular: Regular rhythm.    Mildly tachycardiac  Pulse  Score: 2+   Pulmonary/Chest: Effort normal and breath sounds normal. No respiratory distress.        Abdominal: Soft. Bowel sounds are normal. She exhibits abdominal incision. She exhibits no distension. There is no tenderness.   Vertical skin incision is with bandage in place, minimal amount of old blood on the bandage             Musculoskeletal: Normal range of motion.       Neurological: She is alert and oriented to person, place, and time. She has normal reflexes.    Skin: Skin is warm and dry.    Psychiatric: She has a normal mood and affect.

## 2018-01-07 LAB
APTT BLDCRRT: 32.3 SEC
BASOPHILS # BLD AUTO: 0.01 K/UL
BASOPHILS NFR BLD: 0.1 %
DIFFERENTIAL METHOD: ABNORMAL
EOSINOPHIL # BLD AUTO: 0.3 K/UL
EOSINOPHIL NFR BLD: 2 %
ERYTHROCYTE [DISTWIDTH] IN BLOOD BY AUTOMATED COUNT: 14.4 %
FIBRINOGEN PPP-MCNC: 458 MG/DL
HCT VFR BLD AUTO: 29.5 %
HGB BLD-MCNC: 9.5 G/DL
INR PPP: 0.9
LYMPHOCYTES # BLD AUTO: 2.1 K/UL
LYMPHOCYTES NFR BLD: 15.7 %
MCH RBC QN AUTO: 28.4 PG
MCHC RBC AUTO-ENTMCNC: 32.2 G/DL
MCV RBC AUTO: 88 FL
MONOCYTES # BLD AUTO: 0.8 K/UL
MONOCYTES NFR BLD: 5.9 %
NEUTROPHILS # BLD AUTO: 10 K/UL
NEUTROPHILS NFR BLD: 75.7 %
PLATELET # BLD AUTO: 174 K/UL
PMV BLD AUTO: 9.6 FL
PROTHROMBIN TIME: 10 SEC
RBC # BLD AUTO: 3.34 M/UL
WBC # BLD AUTO: 13.18 K/UL

## 2018-01-07 PROCEDURE — 36415 COLL VENOUS BLD VENIPUNCTURE: CPT

## 2018-01-07 PROCEDURE — 85730 THROMBOPLASTIN TIME PARTIAL: CPT

## 2018-01-07 PROCEDURE — 99231 SBSQ HOSP IP/OBS SF/LOW 25: CPT | Mod: ,,, | Performed by: OBSTETRICS & GYNECOLOGY

## 2018-01-07 PROCEDURE — 11000001 HC ACUTE MED/SURG PRIVATE ROOM

## 2018-01-07 PROCEDURE — 25000003 PHARM REV CODE 250: Performed by: OBSTETRICS & GYNECOLOGY

## 2018-01-07 PROCEDURE — 85025 COMPLETE CBC W/AUTO DIFF WBC: CPT

## 2018-01-07 PROCEDURE — 85610 PROTHROMBIN TIME: CPT

## 2018-01-07 PROCEDURE — 85384 FIBRINOGEN ACTIVITY: CPT

## 2018-01-07 RX ADMIN — OXYCODONE HYDROCHLORIDE AND ACETAMINOPHEN 1 TABLET: 5; 325 TABLET ORAL at 09:01

## 2018-01-07 RX ADMIN — IBUPROFEN 600 MG: 600 TABLET, FILM COATED ORAL at 09:01

## 2018-01-07 RX ADMIN — ONDANSETRON 8 MG: 8 TABLET, ORALLY DISINTEGRATING ORAL at 08:01

## 2018-01-07 RX ADMIN — OXYCODONE HYDROCHLORIDE AND ACETAMINOPHEN 1 TABLET: 10; 325 TABLET ORAL at 07:01

## 2018-01-07 RX ADMIN — DOCUSATE SODIUM 200 MG: 100 CAPSULE, LIQUID FILLED ORAL at 08:01

## 2018-01-07 RX ADMIN — IBUPROFEN 600 MG: 600 TABLET, FILM COATED ORAL at 07:01

## 2018-01-07 RX ADMIN — SIMETHICONE CHEW TAB 80 MG 80 MG: 80 TABLET ORAL at 08:01

## 2018-01-07 RX ADMIN — IBUPROFEN 600 MG: 600 TABLET, FILM COATED ORAL at 03:01

## 2018-01-07 RX ADMIN — DOCUSATE SODIUM 200 MG: 100 CAPSULE, LIQUID FILLED ORAL at 09:01

## 2018-01-07 RX ADMIN — OXYCODONE HYDROCHLORIDE AND ACETAMINOPHEN 1 TABLET: 5; 325 TABLET ORAL at 01:01

## 2018-01-07 RX ADMIN — OXYCODONE HYDROCHLORIDE AND ACETAMINOPHEN 1 TABLET: 10; 325 TABLET ORAL at 03:01

## 2018-01-07 RX ADMIN — MUPIROCIN 1 G: 20 OINTMENT TOPICAL at 08:01

## 2018-01-07 RX ADMIN — MUPIROCIN 1 G: 20 OINTMENT TOPICAL at 09:01

## 2018-01-07 RX ADMIN — SIMETHICONE CHEW TAB 80 MG 80 MG: 80 TABLET ORAL at 01:01

## 2018-01-07 NOTE — SUBJECTIVE & OBJECTIVE
Hospital course: POD#1: Patient is now s/p Classical  and UAE. Patient is currently in the ICU doing well. Tolerating clear liquids. Pain well controlled with PCA  POD#2: S/p PCA. Patient doing well and meeting postoperative goals. Pain controlled.  POD#3: Doing well. Pain controlled oral meds. Ambulating. Voiding spontaneously. CBC collected today and stable ., Coags pending.     Interval History:   Ms. Latif is a now POD#3 s/p Classical  and UAE. She is tolerating a regular diet without nausea or vomiting. She is voiding and ambulating without difficulty. She has passed flatus but has not had a bowel movement. She denies calf tenderness. Her pain is well controlled.    Objective:     Vital Signs (Most Recent):  Temp: 96.1 °F (35.6 °C) (18)  Pulse: 83 (18)  Resp: 18 (1859)  BP: (!) 117/55 (18)  SpO2: 95 % (18 0008) Vital Signs (24h Range):  Temp:  [95.4 °F (35.2 °C)-97.3 °F (36.3 °C)] 96.1 °F (35.6 °C)  Pulse:  [] 83  Resp:  [16-18] 18  SpO2:  [95 %-100 %] 95 %  BP: (115-126)/(55-68) 117/55     Weight: 106 kg (233 lb 11 oz)  Body mass index is 42.74 kg/m².      Intake/Output Summary (Last 24 hours) at 18 06  Last data filed at 18 06   Gross per 24 hour   Intake             2040 ml   Output             2490 ml   Net             -450 ml       Significant Labs:  Lab Results   Component Value Date    GROUPTRH B POS 2018    HEPBSAG Non-reactive 2017       Recent Labs  Lab 18   HGB 9.5*   HCT 29.5*       CBC:     Recent Labs  Lab 18   WBC 13.18*   RBC 3.34*   HGB 9.5*   HCT 29.5*      MCV 88   MCH 28.4   MCHC 32.2     CMP: No results for input(s): GLU, CALCIUM, ALBUMIN, PROT, NA, K, CO2, CL, BUN, CREATININE, ALKPHOS, ALT, AST, BILITOT in the last 48 hours.  I have personallly reviewed all pertinent lab results from the last 24 hours.    Physical Exam:   Constitutional: She is  oriented to person, place, and time. She appears well-developed and well-nourished.       Cardiovascular: Regular rhythm.   Pulse Score: 2+   Pulmonary/Chest: Effort normal and breath sounds normal. No respiratory distress.        Abdominal: Soft. Bowel sounds are normal. She exhibits abdominal incision. She exhibits no distension. There is no tenderness.   Vertical skin incision c/d/i             Musculoskeletal: Normal range of motion.       Neurological: She is alert and oriented to person, place, and time. She has normal reflexes.    Skin: Skin is warm and dry.    Psychiatric: She has a normal mood and affect.

## 2018-01-07 NOTE — ASSESSMENT & PLAN NOTE
- s/p classical  section  - s/p UAE  - Placenta left in situ  - CBC stable this AM  - Caogs pending, will f/u

## 2018-01-07 NOTE — PROGRESS NOTES
[facility]  Obstetrics  Postpartum Progress Note    Patient Name: Parth Latif  MRN: [id number]  Admission Date: 2018  Hospital Length of Stay: 3 days  Attending Physician: Mike Eric MD  Primary Care Provider: Nils Barnes MD    Subjective:     Principal Problem:Placenta percreta    Hospital course: POD#1: Patient is now s/p Classical  and UAE. Patient is currently in the ICU doing well. Tolerating clear liquids. Pain well controlled with PCA  POD#2: S/p PCA. Patient doing well and meeting postoperative goals. Pain controlled.  POD#3: Doing well. Pain controlled oral meds. Ambulating. Voiding spontaneously. CBC collected today and stable 9., Coags pending.     Interval History:   Ms. Latif is a now POD#3 s/p Classical  and UAE. She is tolerating a regular diet without nausea or vomiting. She is voiding and ambulating without difficulty. She has passed flatus but has not had a bowel movement. She denies calf tenderness. Her pain is well controlled.    Objective:     Vital Signs (Most Recent):  Temp: 96.1 °F (35.6 °C) (18)  Pulse: 83 (1857)  Resp: 18 (18 0559)  BP: (!) 117/55 (18)  SpO2: 95 % (18 0008) Vital Signs (24h Range):  Temp:  [95.4 °F (35.2 °C)-97.3 °F (36.3 °C)] 96.1 °F (35.6 °C)  Pulse:  [] 83  Resp:  [16-18] 18  SpO2:  [95 %-100 %] 95 %  BP: (115-126)/(55-68) 117/55     Weight: 106 kg (233 lb 11 oz)  Body mass index is 42.74 kg/m².      Intake/Output Summary (Last 24 hours) at 18 0643  Last data filed at 18 0627   Gross per 24 hour   Intake             2040 ml   Output             2490 ml   Net             -450 ml       Significant Labs:  Lab Results   Component Value Date    GROUPTRH B POS 2018    HEPBSAG Non-reactive 2017       Recent Labs  Lab 18  0519   HGB 9.5*   HCT 29.5*       CBC:     Recent Labs  Lab 18  0519   WBC 13.18*   RBC 3.34*   HGB 9.5*   HCT  29.5*      MCV 88   MCH 28.4   MCHC 32.2     CMP: No results for input(s): GLU, CALCIUM, ALBUMIN, PROT, NA, K, CO2, CL, BUN, CREATININE, ALKPHOS, ALT, AST, BILITOT in the last 48 hours.  I have personallly reviewed all pertinent lab results from the last 24 hours.    Physical Exam:   Constitutional: She is oriented to person, place, and time. She appears well-developed and well-nourished.       Cardiovascular: Regular rhythm.   Pulse Score: 2+   Pulmonary/Chest: Effort normal and breath sounds normal. No respiratory distress.        Abdominal: Soft. Bowel sounds are normal. She exhibits abdominal incision. She exhibits no distension. There is no tenderness.   Vertical skin incision c/d/i             Musculoskeletal: Normal range of motion.       Neurological: She is alert and oriented to person, place, and time. She has normal reflexes.    Skin: Skin is warm and dry.    Psychiatric: She has a normal mood and affect.       Assessment/Plan:     32 y.o. female  for:    * Placenta percreta    - s/p classical  section  - s/p UAE  - Placenta left in situ  - CBC stable this AM  - Caogs pending, will f/u         Status post classical  delivery    - H/h 11.5/60 preop > 9.6/29.3 > 9/29 stable  - Regular diet  - Pain well controlled on PO meds  - UOP adequate and without hematuria  - Male infant in the NICU        History of MRSA infection    - no issues at this time  - s/p Mupirocin in nasal nares prior to procedure            Disposition: As patient meets milestones, will plan to discharge later today after staff rounds.    Eleonora Mcpherson MD  Obstetrics  [facility]

## 2018-01-07 NOTE — ASSESSMENT & PLAN NOTE
- H/h 11.5/60 preop > 9.6/29.3 > 9/29 stable  - Regular diet  - Pain well controlled on PO meds  - UOP adequate and without hematuria  - Male infant in the NICU

## 2018-01-07 NOTE — PLAN OF CARE
"Pt showered. Removed abdominal dressing. Incision intact, dry. No dressing in place. Education given on incisional care; pt verbalized understanding. No vaginal bleeding. Pt states "she feels much better after showering but that she just needs some sleep now." Rest encouraged. No apparent distress noted. Pain well controlled at this time. VSS throughout shift.  "

## 2018-01-08 VITALS
TEMPERATURE: 99 F | DIASTOLIC BLOOD PRESSURE: 64 MMHG | HEIGHT: 62 IN | WEIGHT: 233 LBS | RESPIRATION RATE: 16 BRPM | BODY MASS INDEX: 42.88 KG/M2 | SYSTOLIC BLOOD PRESSURE: 120 MMHG | HEART RATE: 89 BPM | OXYGEN SATURATION: 97 %

## 2018-01-08 LAB
BLD PROD TYP BPU: NORMAL
BLOOD UNIT EXPIRATION DATE: NORMAL
BLOOD UNIT TYPE CODE: 5100
BLOOD UNIT TYPE CODE: 7300
BLOOD UNIT TYPE: NORMAL
CODING SYSTEM: NORMAL
DISPENSE STATUS: NORMAL
TRANS ERYTHROCYTES VOL PATIENT: NORMAL ML

## 2018-01-08 PROCEDURE — 99238 HOSP IP/OBS DSCHRG MGMT 30/<: CPT | Mod: ,,, | Performed by: OBSTETRICS & GYNECOLOGY

## 2018-01-08 PROCEDURE — 90715 TDAP VACCINE 7 YRS/> IM: CPT | Performed by: OBSTETRICS & GYNECOLOGY

## 2018-01-08 PROCEDURE — 90471 IMMUNIZATION ADMIN: CPT | Performed by: OBSTETRICS & GYNECOLOGY

## 2018-01-08 PROCEDURE — 25000003 PHARM REV CODE 250: Performed by: OBSTETRICS & GYNECOLOGY

## 2018-01-08 PROCEDURE — 63600175 PHARM REV CODE 636 W HCPCS: Performed by: OBSTETRICS & GYNECOLOGY

## 2018-01-08 RX ORDER — OXYCODONE AND ACETAMINOPHEN 5; 325 MG/1; MG/1
1 TABLET ORAL EVERY 4 HOURS PRN
Qty: 35 TABLET | Refills: 0 | Status: SHIPPED | OUTPATIENT
Start: 2018-01-08 | End: 2018-01-19

## 2018-01-08 RX ADMIN — IBUPROFEN 600 MG: 600 TABLET, FILM COATED ORAL at 06:01

## 2018-01-08 RX ADMIN — IBUPROFEN 600 MG: 600 TABLET, FILM COATED ORAL at 12:01

## 2018-01-08 RX ADMIN — DOCUSATE SODIUM 200 MG: 100 CAPSULE, LIQUID FILLED ORAL at 08:01

## 2018-01-08 RX ADMIN — IBUPROFEN 600 MG: 600 TABLET, FILM COATED ORAL at 11:01

## 2018-01-08 RX ADMIN — CLOSTRIDIUM TETANI TOXOID ANTIGEN (FORMALDEHYDE INACTIVATED), CORYNEBACTERIUM DIPHTHERIAE TOXOID ANTIGEN (FORMALDEHYDE INACTIVATED), BORDETELLA PERTUSSIS TOXOID ANTIGEN (GLUTARALDEHYDE INACTIVATED), BORDETELLA PERTUSSIS FILAMENTOUS HEMAGGLUTININ ANTIGEN (FORMALDEHYDE INACTIVATED), BORDETELLA PERTUSSIS PERTACTIN ANTIGEN, AND BORDETELLA PERTUSSIS FIMBRIAE 2/3 ANTIGEN 0.5 ML: 5; 2; 2.5; 5; 3; 5 INJECTION, SUSPENSION INTRAMUSCULAR at 10:01

## 2018-01-08 RX ADMIN — MUPIROCIN 1 G: 20 OINTMENT TOPICAL at 08:01

## 2018-01-08 NOTE — ASSESSMENT & PLAN NOTE
- s/p classical  section  - s/p UAE  - Placenta left in situ  - CBC stable this AM  - Coags wnl. APTT slightly elevated at 32.3 (upper normal is 32s)  - Plan for weekly labs in outpatient setting

## 2018-01-08 NOTE — SUBJECTIVE & OBJECTIVE
Hospital course: POD#1: Patient is now s/p Classical  and UAE. Patient is currently in the ICU doing well. Tolerating clear liquids. Pain well controlled with PCA  POD#2: S/p PCA. Patient doing well and meeting postoperative goals. Pain controlled.  POD#3: Doing well. Pain controlled oral meds. Ambulating. Voiding spontaneously. CBC collected today and stable 9.5, Coags pending.   2018 - POD # 4. Doing well. Pain controlled. Ambulating. Voiding spontaneously. Has had BM. CBC stable 9.5/, coags overall normal fibrinogen and PT/INR. APTT slightly elevated at 32.3    Interval History:   Ms. Latif is a now POD#4 s/p Classical  via vertical incision and UAE. She is tolerating a regular diet without nausea or vomiting. She is voiding and ambulating without difficulty. She has passed flatus and has had a bowel movement. She denies calf tenderness. Her pain is well controlled.    Objective:     Vital Signs (Most Recent):  Temp: 98.5 °F (36.9 °C) (18)  Pulse: 88 (18)  Resp: 16 (18)  BP: (!) 106/57 (18)  SpO2: 95 % (18) Vital Signs (24h Range):  Temp:  [97 °F (36.1 °C)-98.5 °F (36.9 °C)] 98.5 °F (36.9 °C)  Pulse:  [] 88  Resp:  [16-18] 16  SpO2:  [95 %-97 %] 95 %  BP: ()/(55-63) 106/57     Weight: 105.7 kg (233 lb)  Body mass index is 42.62 kg/m².    No intake or output data in the 24 hours ending 18    Significant Labs:  Lab Results   Component Value Date    GROUPTRH B POS 2018    HEPBSAG Non-reactive 2017       Recent Labs  Lab 18   HGB 9.5*   HCT 29.5*       CBC:     Recent Labs  Lab 18   WBC 13.18*   RBC 3.34*   HGB 9.5*   HCT 29.5*      MCV 88   MCH 28.4   MCHC 32.2     CMP: No results for input(s): GLU, CALCIUM, ALBUMIN, PROT, NA, K, CO2, CL, BUN, CREATININE, ALKPHOS, ALT, AST, BILITOT in the last 48 hours.  I have personallly reviewed all pertinent lab results  from the last 24 hours.    Physical Exam:   Constitutional: She is oriented to person, place, and time. She appears well-developed and well-nourished.       Cardiovascular: Regular rhythm.   Pulse Score: 2+   Pulmonary/Chest: Effort normal and breath sounds normal. No respiratory distress.        Abdominal: Soft. Bowel sounds are normal. She exhibits abdominal incision. She exhibits no distension. There is no tenderness.   Vertical skin incision c/d/i             Musculoskeletal: Normal range of motion.       Neurological: She is alert and oriented to person, place, and time. She has normal reflexes.    Skin: Skin is warm and dry.    Psychiatric: She has a normal mood and affect.

## 2018-01-08 NOTE — PLAN OF CARE
Problem: Patient Care Overview  Goal: Plan of Care Review  Outcome: Outcome(s) achieved Date Met: 01/08/18  VSS. Ambulating and voiding without difficulty. Vaginal bleeding is small. +Flatus +BM. Tolerating a regular diet. Pain controlled with oral pain medication. Mother baby care guide reviewed with patient prior to discharge; questions answered. Ok to d/c home per MD order. Discharge instructions reviewed with patient and spouse;pt verbalizes understanding. Prescriptions filled and delivered to patient's room by [facility]. Infant in NICU. Patient in wheelchair; patient to be escorted off unit via wheelchair by spouse to visit infant in NICU.

## 2018-01-08 NOTE — PROGRESS NOTES
[facility]  Obstetrics  Postpartum Progress Note    Patient Name: Parth Latif  MRN: [id number]  Admission Date: 2018  Hospital Length of Stay: 4 days  Attending Physician: Mike Eric MD  Primary Care Provider: Nils Barnes MD    Subjective:     Principal Problem:Placenta percreta    Hospital course: POD#1: Patient is now s/p Classical  and UAE. Patient is currently in the ICU doing well. Tolerating clear liquids. Pain well controlled with PCA  POD#2: S/p PCA. Patient doing well and meeting postoperative goals. Pain controlled.  POD#3: Doing well. Pain controlled oral meds. Ambulating. Voiding spontaneously. CBC collected today and stable 9./, Coags pending.   2018 - POD # 4. Doing well. Pain controlled. Ambulating. Voiding spontaneously. Has had BM. CBC stable 9./, coags overall normal fibrinogen and PT/INR. APTT slightly elevated at 32.3    Interval History:   Ms. Latif is a now POD#4 s/p Classical  via vertical incision and UAE. She is tolerating a regular diet without nausea or vomiting. She is voiding and ambulating without difficulty. She has passed flatus and has had a bowel movement. She denies calf tenderness. Her pain is well controlled.    Objective:     Vital Signs (Most Recent):  Temp: 98.5 °F (36.9 °C) (18)  Pulse: 88 (18)  Resp: 16 (18)  BP: (!) 106/57 (18)  SpO2: 95 % (18) Vital Signs (24h Range):  Temp:  [97 °F (36.1 °C)-98.5 °F (36.9 °C)] 98.5 °F (36.9 °C)  Pulse:  [] 88  Resp:  [16-18] 16  SpO2:  [95 %-97 %] 95 %  BP: ()/(55-63) 106/57     Weight: 105.7 kg (233 lb)  Body mass index is 42.62 kg/m².    No intake or output data in the 24 hours ending 18    Significant Labs:  Lab Results   Component Value Date    GROUPTRH B POS 2018    HEPBSAG Non-reactive 2017       Recent Labs  Lab 18  0519   HGB 9.5*   HCT 29.5*       CBC:      Recent Labs  Lab 18  0519   WBC 13.18*   RBC 3.34*   HGB 9.5*   HCT 29.5*      MCV 88   MCH 28.4   MCHC 32.2     CMP: No results for input(s): GLU, CALCIUM, ALBUMIN, PROT, NA, K, CO2, CL, BUN, CREATININE, ALKPHOS, ALT, AST, BILITOT in the last 48 hours.  I have personallly reviewed all pertinent lab results from the last 24 hours.    Physical Exam:   Constitutional: She is oriented to person, place, and time. She appears well-developed and well-nourished.       Cardiovascular: Regular rhythm.   Pulse Score: 2+   Pulmonary/Chest: Effort normal and breath sounds normal. No respiratory distress.        Abdominal: Soft. Bowel sounds are normal. She exhibits abdominal incision. She exhibits no distension. There is no tenderness.   Vertical skin incision c/d/i             Musculoskeletal: Normal range of motion.       Neurological: She is alert and oriented to person, place, and time. She has normal reflexes.    Skin: Skin is warm and dry.    Psychiatric: She has a normal mood and affect.       Assessment/Plan:     32 y.o. female  for:    * Placenta percreta    - s/p classical  section  - s/p UAE  - Placenta left in situ  - CBC stable this AM  - Coags wnl. APTT slightly elevated at 32.3 (upper normal is 32s)  - Plan for weekly labs in outpatient setting        Status post classical  delivery    - H/h 11.5/60 preop > 9.6/29.3 > 9/29 stable  - Regular diet  - Pain well controlled on PO meds  - UOP adequate and without hematuria  - Male infant in the NICU        History of MRSA infection    - no issues at this time  - s/p Mupirocin in nasal nares prior to procedure            Disposition: As patient meets milestones, will plan to discharge today.    Gil Espinal MD  Obstetrics  [facility]

## 2018-01-08 NOTE — DISCHARGE SUMMARY
[facility]  Obstetrics  Discharge Summary      Patient Name: Parth Latif  MRN: [id number]  Admission Date: 2018  Hospital Length of Stay: 4 days  Discharge Date and Time:  2018 6:36 AM  Attending Physician: Mike Eric MD   Discharging Provider: Gil Espinal MD  Primary Care Provider: Nils Barnes MD    HPI:  Parth Latif is a 32 y.o.  female with IUP at 34w2d gestation who presents for scheduled  section with pregnancy complicated by complete placenta previa and placenta percreta. There is high suspicion that there may be bladder involvement. She is doing well this morning. Patient denies contractions and vaginal bleeding at this time. She reports normal fetal movement. She is tolerating a regular diet and denies nausea and vomiting. She has met with IR as patient is also scheduled for immediate bilateral. She has a medical history of upper thigh abscess that was positive for MRSA during her last pregnancy. She has a  section x2.    Procedure(s) (LRB):  EMBOLIZATION (Bilateral)     Hospital Course:   POD#1: Patient is now s/p Classical  and UAE. Patient is currently in the ICU doing well. Tolerating clear liquids. Pain well controlled with PCA  POD#2: S/p PCA. Patient doing well and meeting postoperative goals. Pain controlled.  POD#3: Doing well. Pain controlled oral meds. Ambulating. Voiding spontaneously. CBC collected today and stable 9.5, Coags pending.   2018 - POD # 4. Doing well. Pain controlled. Ambulating. Voiding spontaneously. Has had BM. CBC stable 9.5/, coags overall normal fibrinogen and PT/INR. APTT slightly elevated at 32.3        Final Active Diagnoses:    Diagnosis Date Noted POA    PRINCIPAL PROBLEM:  Placenta percreta [O43.239] 2018 Yes    Status post classical  delivery [Z98.891] 2018 Not Applicable    History of MRSA infection [Z86.14] 10/19/2017 Yes      Problems Resolved  During this Admission:    Diagnosis Date Noted Date Resolved POA        Labs: All labs within the past 24 hours have been reviewed    Feeding Method: both breast and bottle    Immunizations     Date Immunization Status Dose Route/Site Given by    18 MMR Incomplete 0.5 mL Subcutaneous/Left deltoid     18 Tdap Incomplete 0.5 mL Intramuscular/Left deltoid           Delivery:    Episiotomy: None   Lacerations: None   Repair suture:     Repair # of packets:     Blood loss (ml): 0     Birth information:  YOB: 2017   Time of birth: 10:03 AM   Sex: male   Delivery type: , Classical   Gestational Age: 34w2d    Delivery Clinician:      Other providers:       Additional  information:  Forceps:    Vacuum:    Breech:    Observed anomalies      Living?:           APGARS  One minute Five minutes Ten minutes   Skin color:         Heart rate:         Grimace:         Muscle tone:         Breathing:         Totals: 9  9        Placenta: Delivered:       appearance    Pending Diagnostic Studies:     Procedure Component Value Units Date/Time    IR Embolization Uterine Fibroid [[id number]] Updated:  18    Order Status:  Sent Lab Status:  In process           Discharged Condition: good    Disposition: Home or Self Care    Follow Up:  Follow-up Information     Mike Eric MD In 1 week.    Specialty:  Maternal and Fetal Medicine  Why:  Post Op Visit  Contact information:  [address] [place], [place] [id number] [phone]                 Patient Instructions:     Protime-INR   Standing Status: Future  Standing Exp. Date: 19     CBC auto differential   Standing Status: Future  Standing Exp. Date: 19     Comprehensive metabolic panel   Standing Status: Future  Standing Exp. Date: 19     APTT   Standing Status: Future  Standing Exp. Date: 19     Diet Adult Regular     Lifting restrictions   Scheduling Instructions: Nothing over 10 pounds for  two weeks     No driving until:   Order Comments: Pain free and off of pain meds       Medications:  Current Discharge Medication List      START taking these medications    Details   lanolin Crea cream Apply topically as needed for Dry Skin.  Refills: 0    Associated Diagnoses: Status post  delivery      oxyCODONE-acetaminophen (PERCOCET) 5-325 mg per tablet Take 1 tablet by mouth every 4 (four) hours as needed.  Qty: 35 tablet, Refills: 0    Associated Diagnoses: Status post  delivery         CONTINUE these medications which have NOT CHANGED    Details   PNV,CALCIUM 72/IRON/FOLIC ACID (PRENATAL VITAMIN) Tab Take 1 tablet by mouth once daily.             Gil Espinal MD  Obstetrics  [facility]

## 2018-01-09 NOTE — OP NOTE
DATE OF PROCEDURE:  2018    SURGEON:  Lamont Bowens M.D.    ASSISTANT:  Mike Eric and Chente Saini.    PREOPERATIVE DIAGNOSES:  Known placenta accreta with placenta previa with   questionable invasion into the right parametrium and the bladder.    POSTOPERATIVE DIAGNOSES:  At least placenta accreta with obliteration of the   right parametrium all the way to the right pelvic sidewall.    PROCEDURE:  For my portion was exploration of the abdomen and uterus and   parametrium.    COMPLICATIONS:  None.    ESTIMATED BLOOD LOSS:  From my portion of the procedure 0.    This was a combined case with Dr. Eric who had just prior performed    delivery of the infant.    INTRAOPERATIVE FINDINGS:  As above.    PROCEDURE IN DETAIL:  After delivery of the infant, closure of the hysterotomy,   inspection of the abdomen and pelvis was performed.  Circumferential inspection   of the lower uterine segment revealed a large placenta with adhesions of the   bladder anteriorly and extrusion of the placenta into the right parametrium and   broad ligament out to the right pelvic sidewall.  I deemed this better to be   left in situ with immediate postoperative embolization and therefore Dr. Eric   and Dr. Saini performed closure of the abdomen after hemostasis was ensured.    They will dictate that portion of the procedure.      DANNIELLE/IN  dd: 2018 08:30:32 (CST)  td: 2018 08:50:27 (CST)  Doc ID   #[id number]  Job ID #[id number]    CC:

## 2018-01-16 ENCOUNTER — LAB VISIT (OUTPATIENT)
Dept: LAB | Facility: OTHER | Age: 33
End: 2018-01-16
Payer: COMMERCIAL

## 2018-01-16 DIAGNOSIS — O43.239: ICD-10-CM

## 2018-01-16 LAB
ALBUMIN SERPL BCP-MCNC: 2.9 G/DL
ALP SERPL-CCNC: 225 U/L
ALT SERPL W/O P-5'-P-CCNC: 12 U/L
ANION GAP SERPL CALC-SCNC: 11 MMOL/L
APTT BLDCRRT: 33.5 SEC
AST SERPL-CCNC: 12 U/L
BASOPHILS # BLD AUTO: 0.02 K/UL
BASOPHILS NFR BLD: 0.2 %
BILIRUB SERPL-MCNC: 0.3 MG/DL
BUN SERPL-MCNC: 7 MG/DL
CALCIUM SERPL-MCNC: 9.8 MG/DL
CHLORIDE SERPL-SCNC: 103 MMOL/L
CO2 SERPL-SCNC: 23 MMOL/L
CREAT SERPL-MCNC: 0.7 MG/DL
DIFFERENTIAL METHOD: ABNORMAL
EOSINOPHIL # BLD AUTO: 0.2 K/UL
EOSINOPHIL NFR BLD: 2 %
ERYTHROCYTE [DISTWIDTH] IN BLOOD BY AUTOMATED COUNT: 14 %
EST. GFR  (AFRICAN AMERICAN): >60 ML/MIN/1.73 M^2
EST. GFR  (NON AFRICAN AMERICAN): >60 ML/MIN/1.73 M^2
GLUCOSE SERPL-MCNC: 90 MG/DL
HCT VFR BLD AUTO: 33.9 %
HGB BLD-MCNC: 11 G/DL
INR PPP: 0.9
LYMPHOCYTES # BLD AUTO: 2.1 K/UL
LYMPHOCYTES NFR BLD: 16.8 %
MCH RBC QN AUTO: 28.3 PG
MCHC RBC AUTO-ENTMCNC: 32.4 G/DL
MCV RBC AUTO: 87 FL
MONOCYTES # BLD AUTO: 0.7 K/UL
MONOCYTES NFR BLD: 5.8 %
NEUTROPHILS # BLD AUTO: 9.2 K/UL
NEUTROPHILS NFR BLD: 74.3 %
PLATELET # BLD AUTO: 278 K/UL
PMV BLD AUTO: 9.6 FL
POTASSIUM SERPL-SCNC: 4.1 MMOL/L
PROT SERPL-MCNC: 7.6 G/DL
PROTHROMBIN TIME: 10.4 SEC
RBC # BLD AUTO: 3.89 M/UL
SODIUM SERPL-SCNC: 137 MMOL/L
WBC # BLD AUTO: 12.29 K/UL

## 2018-01-16 PROCEDURE — 36415 COLL VENOUS BLD VENIPUNCTURE: CPT

## 2018-01-16 PROCEDURE — 85730 THROMBOPLASTIN TIME PARTIAL: CPT

## 2018-01-16 PROCEDURE — 80053 COMPREHEN METABOLIC PANEL: CPT

## 2018-01-16 PROCEDURE — 85610 PROTHROMBIN TIME: CPT

## 2018-01-16 PROCEDURE — 85025 COMPLETE CBC W/AUTO DIFF WBC: CPT

## 2018-01-19 ENCOUNTER — LAB VISIT (OUTPATIENT)
Dept: LAB | Facility: OTHER | Age: 33
End: 2018-01-19
Attending: OBSTETRICS & GYNECOLOGY
Payer: COMMERCIAL

## 2018-01-19 ENCOUNTER — POSTPARTUM VISIT (OUTPATIENT)
Dept: MATERNAL FETAL MEDICINE | Facility: CLINIC | Age: 33
End: 2018-01-19
Payer: COMMERCIAL

## 2018-01-19 VITALS
TEMPERATURE: 98 F | DIASTOLIC BLOOD PRESSURE: 70 MMHG | BODY MASS INDEX: 39.76 KG/M2 | WEIGHT: 217.38 LBS | SYSTOLIC BLOOD PRESSURE: 122 MMHG

## 2018-01-19 DIAGNOSIS — O43.239: ICD-10-CM

## 2018-01-19 DIAGNOSIS — O43.239: Primary | ICD-10-CM

## 2018-01-19 LAB
BASOPHILS # BLD AUTO: 0.02 K/UL
BASOPHILS NFR BLD: 0.2 %
DIFFERENTIAL METHOD: ABNORMAL
EOSINOPHIL # BLD AUTO: 0.3 K/UL
EOSINOPHIL NFR BLD: 2.2 %
ERYTHROCYTE [DISTWIDTH] IN BLOOD BY AUTOMATED COUNT: 13.8 %
FIBRINOGEN PPP-MCNC: 522 MG/DL
HCT VFR BLD AUTO: 33.8 %
HGB BLD-MCNC: 10.9 G/DL
INR PPP: 0.9
LYMPHOCYTES # BLD AUTO: 2.2 K/UL
LYMPHOCYTES NFR BLD: 19 %
MCH RBC QN AUTO: 27.9 PG
MCHC RBC AUTO-ENTMCNC: 32.2 G/DL
MCV RBC AUTO: 86 FL
MONOCYTES # BLD AUTO: 0.8 K/UL
MONOCYTES NFR BLD: 6.4 %
NEUTROPHILS # BLD AUTO: 8.4 K/UL
NEUTROPHILS NFR BLD: 71.4 %
PLATELET # BLD AUTO: 295 K/UL
PMV BLD AUTO: 9.3 FL
PROTHROMBIN TIME: 10.4 SEC
RBC # BLD AUTO: 3.91 M/UL
WBC # BLD AUTO: 11.8 K/UL

## 2018-01-19 PROCEDURE — 85610 PROTHROMBIN TIME: CPT

## 2018-01-19 PROCEDURE — 85384 FIBRINOGEN ACTIVITY: CPT

## 2018-01-19 PROCEDURE — 85025 COMPLETE CBC W/AUTO DIFF WBC: CPT

## 2018-01-19 PROCEDURE — 0503F POSTPARTUM CARE VISIT: CPT | Mod: S$GLB,,, | Performed by: OBSTETRICS & GYNECOLOGY

## 2018-01-19 PROCEDURE — 99999 PR PBB SHADOW E&M-EST. PATIENT-LVL III: CPT | Mod: PBBFAC,,, | Performed by: OBSTETRICS & GYNECOLOGY

## 2018-01-19 PROCEDURE — 36415 COLL VENOUS BLD VENIPUNCTURE: CPT

## 2018-01-19 NOTE — PROGRESS NOTES
Subjective:       Patient ID: Parth Latif is a 32 y.o. female.    Chief Complaint:  Postpartum Follow-up      History of Present Illness  HPI  Doing well overall   Normal bowel function. Tolerating po intake. Has had intermittent nausea. Pain well controlled.  Has had mild URI sx with congestion--taking OTC cold meds.  No abnormal bruising or bleeding.  Last night had a minimal amount of old blood from vaginal. No bright red blood or bleeding.     GYN & OB History  No LMP recorded.   Date of Last Pap: 2017    OB History    Para Term  AB Living   3 3 2 1   3   SAB TAB Ectopic Multiple Live Births         0 3      # Outcome Date GA Lbr Anant/2nd Weight Sex Delivery Anes PTL Lv   3  18 34w2d  3.24 kg (7 lb 2.3 oz) M CS-Classical Spinal, EPI N HERO      Complications: Placenta percreta   2 Term  39w0d  3.629 kg (8 lb) F CS-LTranv  N HERO      Birth Comments: repeat CS, s/p cs staph infection to LT abdominal incision, hospitalized x 1 week    1 Term  39w0d  3.487 kg (7 lb 11 oz) M CS-LTranv  N HERO      Complications: Breech presentation, antepartum      Birth Comments: breech          Review of Systems  Review of Systems   Constitutional: Positive for fatigue.   HENT: Negative.    Respiratory: Negative for shortness of breath.    Cardiovascular: Negative for chest pain and leg swelling.   Gastrointestinal: Negative for abdominal pain, bloating, blood in stool, constipation, diarrhea and vomiting.   Endocrine: Positive for hot flashes.   Genitourinary: Negative for hematuria, pelvic pain, vaginal bleeding, vaginal discharge and urinary incontinence.   Musculoskeletal: Negative for back pain.   Skin:  Negative.   Neurological: Negative.    Hematological: Negative.    Psychiatric/Behavioral: Negative.    Breast: negative.    Has felt considerable temperature fluctuations with hot flashes.        Objective:    Physical Exam:   Constitutional: She is oriented to person, place, and  time. She appears well-nourished. No distress.    HENT:   Head: Normocephalic.    Eyes: EOM are normal.    Neck: Normal range of motion.    Cardiovascular: Normal rate.     Pulmonary/Chest: Effort normal.        Abdominal: Soft. She exhibits abdominal incision. She exhibits no distension and no mass. There is no tenderness. There is no rebound and no guarding.   Incision clean, dry, intact; healing well.              Musculoskeletal: Normal range of motion.       Neurological: She is alert and oriented to person, place, and time.    Skin: Skin is warm and dry.    Psychiatric: She has a normal mood and affect.        Labs obtained earlier in the week reviewed and wnl. Fibrinogen was not done therefore sent to lab today to get repeat coags and CBC.  Assessment:        1. Placenta percreta, s/p  delivery and embolization with retained placenta--POD 15   Overall stable and doing well today with normal labs earlier in the week. No evidence of infection or bleeding.        Plan:      Repeat labs today  Follow-up with Dr. Bowens next week with repeat labs  Plan to continue expectant management with plan for interval hysterectomy in next 4-6 weeks with plan for re-embolization prior.  Precautions reviewed with patient.

## 2018-01-22 ENCOUNTER — TELEPHONE (OUTPATIENT)
Dept: MATERNAL FETAL MEDICINE | Facility: CLINIC | Age: 33
End: 2018-01-22

## 2018-01-22 NOTE — TELEPHONE ENCOUNTER
Lab results from 1/19/18 reviewed by Dr. Eric and pt was notified of good results. Pt is scheduled for a f/u with Dr. Bowens.    Pt verbalized understanding of information.

## 2018-01-29 ENCOUNTER — OFFICE VISIT (OUTPATIENT)
Dept: GYNECOLOGIC ONCOLOGY | Facility: CLINIC | Age: 33
End: 2018-01-29
Payer: COMMERCIAL

## 2018-01-29 ENCOUNTER — LAB VISIT (OUTPATIENT)
Dept: LAB | Facility: OTHER | Age: 33
End: 2018-01-29
Payer: COMMERCIAL

## 2018-01-29 VITALS — WEIGHT: 214.5 LBS | BODY MASS INDEX: 39.23 KG/M2

## 2018-01-29 DIAGNOSIS — O43.233 PLACENTA PERCRETA IN THIRD TRIMESTER: Primary | ICD-10-CM

## 2018-01-29 DIAGNOSIS — O43.233 PLACENTA PERCRETA IN THIRD TRIMESTER: ICD-10-CM

## 2018-01-29 DIAGNOSIS — Z98.891 STATUS POST CESAREAN DELIVERY: ICD-10-CM

## 2018-01-29 LAB
BASOPHILS # BLD AUTO: 0.02 K/UL
BASOPHILS NFR BLD: 0.2 %
DIFFERENTIAL METHOD: ABNORMAL
EOSINOPHIL # BLD AUTO: 0.2 K/UL
EOSINOPHIL NFR BLD: 1.3 %
ERYTHROCYTE [DISTWIDTH] IN BLOOD BY AUTOMATED COUNT: 14.3 %
FIBRINOGEN PPP-MCNC: 434 MG/DL
HCT VFR BLD AUTO: 35.5 %
HGB BLD-MCNC: 11.4 G/DL
LYMPHOCYTES # BLD AUTO: 2.3 K/UL
LYMPHOCYTES NFR BLD: 18.1 %
MCH RBC QN AUTO: 28 PG
MCHC RBC AUTO-ENTMCNC: 32.1 G/DL
MCV RBC AUTO: 87 FL
MONOCYTES # BLD AUTO: 0.7 K/UL
MONOCYTES NFR BLD: 5.5 %
NEUTROPHILS # BLD AUTO: 9.5 K/UL
NEUTROPHILS NFR BLD: 74.5 %
PLATELET # BLD AUTO: 242 K/UL
PMV BLD AUTO: 9.5 FL
RBC # BLD AUTO: 4.07 M/UL
WBC # BLD AUTO: 12.68 K/UL

## 2018-01-29 PROCEDURE — 85384 FIBRINOGEN ACTIVITY: CPT

## 2018-01-29 PROCEDURE — 99214 OFFICE O/P EST MOD 30 MIN: CPT | Mod: 24,S$GLB,, | Performed by: OBSTETRICS & GYNECOLOGY

## 2018-01-29 PROCEDURE — 85025 COMPLETE CBC W/AUTO DIFF WBC: CPT

## 2018-01-29 PROCEDURE — 36415 COLL VENOUS BLD VENIPUNCTURE: CPT

## 2018-01-29 PROCEDURE — 99999 PR PBB SHADOW E&M-EST. PATIENT-LVL II: CPT | Mod: PBBFAC,,, | Performed by: OBSTETRICS & GYNECOLOGY

## 2018-01-29 NOTE — LETTER
January 29, 2018      Mike Eric MD  [address]  4th Floor  [facility] [address]           [facility] - Gynecologic Oncology  [address], [address]  [facility] [address]  Phone: [phone]  Fax: [phone]          Patient: Parth Latif   MR Number: [id number]   YOB: 1985   Date of Visit: 1/29/2018       Dear Dr. Mike Eric:    Thank you for referring Parth Latif to me for evaluation. Attached you will find relevant portions of my assessment and plan of care.    If you have questions, please do not hesitate to call me. I look forward to following Parth Latif along with you.    Sincerely,    Lamont Bowens MD    Enclosure  CC:  No Recipients    If you would like to receive this communication electronically, please contact [email or web address] or [phone] to request more information on [employer] access.    For providers and/or their staff who would like to refer a patient to [facility], please contact us through our one-stop-shop provider referral line, [facility], at [phone].    If you feel you have received this communication in error or would no longer like to receive these types of communications, please e-mail [email or web address]

## 2018-01-29 NOTE — PROGRESS NOTES
"Subjective:      Patient ID: Parth Latif is a 32 y.o. female.    Chief Complaint: abnormal placenta affecting management of mother in second t (f/u)      HPI     Ms. Latif is a here for f/u and to schedule her completion hysterectomy.  Was last seen by Dr. Eric on 18 with normal labs.  Denies F/C, N/V.  Has had no VB.  C/S was on 18.  Has felt well overall.    [facility] 10/26/2017  "complete placenta previa with an anterior placenta. In the area of the old hysterotomy there appears to be a loss of the normal  interface between the placenta and the endometrium. There is an abnormal contour to the uterus in this area and there are multiple placental  lakes. In the area of the vesicouterine reflection there is marked vascularity which appears to be maternal in nature. There does not appear to  be any obvious invasion of the bladder although the posterior wall of the bladder is thin and it looks like the vessels in this area push on it. With  ultrasound I do not see any obvious parametrial involvement but it is difficult to image this area well"        Review of Systems   Constitutional: Negative for chills, diaphoresis, fatigue and fever.   Respiratory: Negative for chest tightness, shortness of breath and wheezing.    Cardiovascular: Negative for chest pain, palpitations and leg swelling.   Gastrointestinal: Negative for abdominal pain, constipation, diarrhea, nausea and vomiting.   Genitourinary: Negative for difficulty urinating, dyspareunia, dysuria, flank pain, frequency, genital sores, hematuria, pelvic pain, urgency, vaginal bleeding, vaginal discharge and vaginal pain.   Skin: Negative for color change.   Neurological: Negative for dizziness, light-headedness and headaches.   Psychiatric/Behavioral: Negative for agitation.       Past Medical History:   Diagnosis Date    MRSA (methicillin resistant staph aureus) culture positive      Past Surgical History:   Procedure Laterality Date     " SECTION      x2     MANDIBLE SURGERY      TONSILLECTOMY       Family History   Problem Relation Age of Onset    Hypertension Mother     No Known Problems Father      Social History     Social History    Marital status:      Spouse name: N/A    Number of children: N/A    Years of education: N/A     Occupational History    Not on file.     Social History Main Topics    Smoking status: Never Smoker    Smokeless tobacco: Never Used    Alcohol use No    Drug use: No    Sexual activity: Yes     Partners: Male     Birth control/ protection: IUD     Other Topics Concern    Not on file     Social History Narrative    No narrative on file     Current Outpatient Prescriptions   Medication Sig    PNV,CALCIUM 72/IRON/FOLIC ACID (PRENATAL VITAMIN) Tab Take 1 tablet by mouth once daily.     No current facility-administered medications for this visit.      Review of patient's allergies indicates:   Allergen Reactions    Pcn [penicillins] Hives       Objective:   Physical Exam:   Constitutional: She is oriented to person, place, and time. She appears well-developed and well-nourished. No distress.    HENT:   Head: Normocephalic and atraumatic.     Neck: Normal range of motion.    Cardiovascular: Exam reveals no cyanosis and no edema.     Pulmonary/Chest: Effort normal. No respiratory distress. She exhibits no tenderness.        Abdominal: She exhibits mass (gravid uterus extending above umbilicus). She exhibits no distension (incision healing well), no fluid wave and no ascites.                 Musculoskeletal: Normal range of motion and moves all extremeties.       Neurological: She is alert and oriented to person, place, and time.    Skin: Skin is warm and dry. She is not diaphoretic. No cyanosis.    Psychiatric: She has a normal mood and affect.       Assessment:     1. Placenta percreta in third trimester    2. Status post classical  delivery        Plan:       Will be 4 weeks PP this Thursday,  Feb 1.  Given upcoming schedule, and the fact that both Dr. Eric and I will be out after next week, will move to complete surgery on 2/6/18.  She was previously counseled on hysterectomy and the risks thereof.  No further questions today.  I have cases Tuesday morning at [facility], so will plan to go around noon or 1.  Will preadmit on Monday for T&C and embolization.  All questions answered.  WIll notify team.

## 2018-01-31 ENCOUNTER — TELEPHONE (OUTPATIENT)
Dept: GYNECOLOGIC ONCOLOGY | Facility: CLINIC | Age: 33
End: 2018-01-31

## 2018-01-31 DIAGNOSIS — O43.233 PLACENTA PERCRETA IN THIRD TRIMESTER: Primary | ICD-10-CM

## 2018-02-02 ENCOUNTER — ANESTHESIA EVENT (OUTPATIENT)
Dept: SURGERY | Facility: OTHER | Age: 33
DRG: 769 | End: 2018-02-02
Payer: COMMERCIAL

## 2018-02-04 ENCOUNTER — TELEPHONE (OUTPATIENT)
Dept: OBSTETRICS AND GYNECOLOGY | Facility: HOSPITAL | Age: 33
End: 2018-02-04

## 2018-02-05 ENCOUNTER — ANESTHESIA EVENT (OUTPATIENT)
Dept: CARDIOLOGY | Facility: OTHER | Age: 33
End: 2018-02-05

## 2018-02-05 ENCOUNTER — HOSPITAL ENCOUNTER (INPATIENT)
Facility: OTHER | Age: 33
LOS: 4 days | Discharge: HOME | DRG: 769 | End: 2018-02-09
Attending: OBSTETRICS & GYNECOLOGY | Admitting: OBSTETRICS & GYNECOLOGY
Payer: COMMERCIAL

## 2018-02-05 ENCOUNTER — ANESTHESIA (OUTPATIENT)
Dept: CARDIOLOGY | Facility: OTHER | Age: 33
End: 2018-02-05

## 2018-02-05 ENCOUNTER — SURGERY (OUTPATIENT)
Age: 33
End: 2018-02-05

## 2018-02-05 DIAGNOSIS — Z90.710 S/P TAH (TOTAL ABDOMINAL HYSTERECTOMY): Primary | ICD-10-CM

## 2018-02-05 DIAGNOSIS — O43.239: ICD-10-CM

## 2018-02-05 DIAGNOSIS — Z98.890 S/P BLADDER REPAIR: ICD-10-CM

## 2018-02-05 DIAGNOSIS — O43.109 PLACENTAL ABNORMALITY: ICD-10-CM

## 2018-02-05 DIAGNOSIS — O43.239: Primary | ICD-10-CM

## 2018-02-05 DIAGNOSIS — O43.233 PLACENTA PERCRETA IN THIRD TRIMESTER: ICD-10-CM

## 2018-02-05 PROBLEM — J06.9 URI (UPPER RESPIRATORY INFECTION): Status: ACTIVE | Noted: 2018-02-05

## 2018-02-05 LAB
ABO + RH BLD: NORMAL
ANION GAP SERPL CALC-SCNC: 10 MMOL/L
BASOPHILS # BLD AUTO: 0.01 K/UL
BASOPHILS NFR BLD: 0.1 %
BLD GP AB SCN CELLS X3 SERPL QL: NORMAL
BUN SERPL-MCNC: 5 MG/DL
CALCIUM SERPL-MCNC: 9.2 MG/DL
CHLORIDE SERPL-SCNC: 106 MMOL/L
CO2 SERPL-SCNC: 21 MMOL/L
CREAT SERPL-MCNC: 0.6 MG/DL
DIFFERENTIAL METHOD: ABNORMAL
EOSINOPHIL # BLD AUTO: 0.2 K/UL
EOSINOPHIL NFR BLD: 2 %
ERYTHROCYTE [DISTWIDTH] IN BLOOD BY AUTOMATED COUNT: 14.7 %
EST. GFR  (AFRICAN AMERICAN): >60 ML/MIN/1.73 M^2
EST. GFR  (NON AFRICAN AMERICAN): >60 ML/MIN/1.73 M^2
FIBRINOGEN PPP-MCNC: 392 MG/DL
GLUCOSE SERPL-MCNC: 81 MG/DL
HCT VFR BLD AUTO: 34.1 %
HGB BLD-MCNC: 11 G/DL
INR PPP: 0.9
LYMPHOCYTES # BLD AUTO: 2.3 K/UL
LYMPHOCYTES NFR BLD: 19.9 %
MCH RBC QN AUTO: 27.8 PG
MCHC RBC AUTO-ENTMCNC: 32.3 G/DL
MCV RBC AUTO: 86 FL
MONOCYTES # BLD AUTO: 0.6 K/UL
MONOCYTES NFR BLD: 5.1 %
NEUTROPHILS # BLD AUTO: 8.5 K/UL
NEUTROPHILS NFR BLD: 72.6 %
PLATELET # BLD AUTO: 206 K/UL
PMV BLD AUTO: 9.4 FL
POTASSIUM SERPL-SCNC: 4.4 MMOL/L
PROTHROMBIN TIME: 10.2 SEC
RBC # BLD AUTO: 3.95 M/UL
SODIUM SERPL-SCNC: 137 MMOL/L
WBC # BLD AUTO: 11.68 K/UL

## 2018-02-05 PROCEDURE — 25000003 PHARM REV CODE 250: Performed by: STUDENT IN AN ORGANIZED HEALTH CARE EDUCATION/TRAINING PROGRAM

## 2018-02-05 PROCEDURE — 85025 COMPLETE CBC W/AUTO DIFF WBC: CPT

## 2018-02-05 PROCEDURE — 63600175 PHARM REV CODE 636 W HCPCS

## 2018-02-05 PROCEDURE — 99153 MOD SED SAME PHYS/QHP EA: CPT | Performed by: RADIOLOGY

## 2018-02-05 PROCEDURE — C1887 CATHETER, GUIDING: HCPCS | Performed by: RADIOLOGY

## 2018-02-05 PROCEDURE — 27201224 HC CATH ANGIOGRAM: Performed by: RADIOLOGY

## 2018-02-05 PROCEDURE — C1769 GUIDE WIRE: HCPCS | Performed by: RADIOLOGY

## 2018-02-05 PROCEDURE — 25000003 PHARM REV CODE 250

## 2018-02-05 PROCEDURE — 86850 RBC ANTIBODY SCREEN: CPT

## 2018-02-05 PROCEDURE — 80048 BASIC METABOLIC PNL TOTAL CA: CPT

## 2018-02-05 PROCEDURE — 25000003 PHARM REV CODE 250: Performed by: RADIOLOGY

## 2018-02-05 PROCEDURE — 99152 MOD SED SAME PHYS/QHP 5/>YRS: CPT | Performed by: RADIOLOGY

## 2018-02-05 PROCEDURE — 04LE3ZT OCCLUSION OF RIGHT UTERINE ARTERY, PERCUTANEOUS APPROACH: ICD-10-PCS | Performed by: RADIOLOGY

## 2018-02-05 PROCEDURE — 11000001 HC ACUTE MED/SURG PRIVATE ROOM

## 2018-02-05 PROCEDURE — 86920 COMPATIBILITY TEST SPIN: CPT

## 2018-02-05 PROCEDURE — 99223 1ST HOSP IP/OBS HIGH 75: CPT | Mod: ,,, | Performed by: OBSTETRICS & GYNECOLOGY

## 2018-02-05 PROCEDURE — 85384 FIBRINOGEN ACTIVITY: CPT

## 2018-02-05 PROCEDURE — 04LF3ZU OCCLUSION OF LEFT UTERINE ARTERY, PERCUTANEOUS APPROACH: ICD-10-PCS | Performed by: RADIOLOGY

## 2018-02-05 PROCEDURE — 25500020 PHARM REV CODE 255

## 2018-02-05 PROCEDURE — 85610 PROTHROMBIN TIME: CPT

## 2018-02-05 PROCEDURE — 63600175 PHARM REV CODE 636 W HCPCS: Performed by: RADIOLOGY

## 2018-02-05 RX ORDER — CIPROFLOXACIN 2 MG/ML
INJECTION, SOLUTION INTRAVENOUS
Status: DISCONTINUED | OUTPATIENT
Start: 2018-02-05 | End: 2018-02-06

## 2018-02-05 RX ORDER — ONDANSETRON 2 MG/ML
INJECTION INTRAMUSCULAR; INTRAVENOUS
Status: DISCONTINUED | OUTPATIENT
Start: 2018-02-05 | End: 2018-02-06 | Stop reason: HOSPADM

## 2018-02-05 RX ORDER — PROMETHAZINE HYDROCHLORIDE 25 MG/ML
25 INJECTION, SOLUTION INTRAMUSCULAR; INTRAVENOUS EVERY 6 HOURS PRN
Status: DISCONTINUED | OUTPATIENT
Start: 2018-02-05 | End: 2018-02-06

## 2018-02-05 RX ORDER — ONDANSETRON 8 MG/1
8 TABLET, ORALLY DISINTEGRATING ORAL EVERY 8 HOURS PRN
Status: DISCONTINUED | OUTPATIENT
Start: 2018-02-05 | End: 2018-02-06

## 2018-02-05 RX ORDER — HYDROMORPHONE HYDROCHLORIDE 2 MG/ML
1 INJECTION, SOLUTION INTRAMUSCULAR; INTRAVENOUS; SUBCUTANEOUS ONCE
Status: COMPLETED | OUTPATIENT
Start: 2018-02-05 | End: 2018-02-05

## 2018-02-05 RX ORDER — AZITHROMYCIN 250 MG/1
250 TABLET, FILM COATED ORAL ONCE
Status: COMPLETED | OUTPATIENT
Start: 2018-02-05 | End: 2018-02-05

## 2018-02-05 RX ORDER — ALBUTEROL SULFATE 0.83 MG/ML
2.5 SOLUTION RESPIRATORY (INHALATION) EVERY 4 HOURS PRN
Status: DISCONTINUED | OUTPATIENT
Start: 2018-02-05 | End: 2018-02-09 | Stop reason: HOSPADM

## 2018-02-05 RX ORDER — HYDROCODONE BITARTRATE AND ACETAMINOPHEN 5; 325 MG/1; MG/1
2 TABLET ORAL EVERY 4 HOURS PRN
Status: DISCONTINUED | OUTPATIENT
Start: 2018-02-05 | End: 2018-02-06

## 2018-02-05 RX ORDER — FENTANYL CITRATE 50 UG/ML
INJECTION, SOLUTION INTRAMUSCULAR; INTRAVENOUS
Status: DISCONTINUED | OUTPATIENT
Start: 2018-02-05 | End: 2018-02-06 | Stop reason: HOSPADM

## 2018-02-05 RX ORDER — DIPHENHYDRAMINE HYDROCHLORIDE 50 MG/ML
INJECTION INTRAMUSCULAR; INTRAVENOUS
Status: DISCONTINUED | OUTPATIENT
Start: 2018-02-05 | End: 2018-02-06 | Stop reason: HOSPADM

## 2018-02-05 RX ORDER — SODIUM CHLORIDE 9 MG/ML
INJECTION, SOLUTION INTRAVENOUS CONTINUOUS
Status: DISCONTINUED | OUTPATIENT
Start: 2018-02-05 | End: 2018-02-05

## 2018-02-05 RX ORDER — MIDAZOLAM HYDROCHLORIDE 1 MG/ML
INJECTION, SOLUTION INTRAMUSCULAR; INTRAVENOUS
Status: DISCONTINUED | OUTPATIENT
Start: 2018-02-05 | End: 2018-02-06 | Stop reason: HOSPADM

## 2018-02-05 RX ORDER — HEPARIN SODIUM 1000 [USP'U]/ML
INJECTION, SOLUTION INTRAVENOUS; SUBCUTANEOUS
Status: DISCONTINUED | OUTPATIENT
Start: 2018-02-05 | End: 2018-02-06 | Stop reason: HOSPADM

## 2018-02-05 RX ORDER — NITROGLYCERIN 5 MG/ML
INJECTION, SOLUTION INTRAVENOUS
Status: DISCONTINUED | OUTPATIENT
Start: 2018-02-05 | End: 2018-02-06 | Stop reason: HOSPADM

## 2018-02-05 RX ORDER — HYDROMORPHONE HYDROCHLORIDE 1 MG/ML
1 INJECTION, SOLUTION INTRAMUSCULAR; INTRAVENOUS; SUBCUTANEOUS
Status: DISCONTINUED | OUTPATIENT
Start: 2018-02-05 | End: 2018-02-06

## 2018-02-05 RX ORDER — SODIUM CHLORIDE, SODIUM LACTATE, POTASSIUM CHLORIDE, CALCIUM CHLORIDE 600; 310; 30; 20 MG/100ML; MG/100ML; MG/100ML; MG/100ML
INJECTION, SOLUTION INTRAVENOUS CONTINUOUS
Status: DISCONTINUED | OUTPATIENT
Start: 2018-02-05 | End: 2018-02-06

## 2018-02-05 RX ORDER — HYDROMORPHONE HYDROCHLORIDE 2 MG/ML
INJECTION, SOLUTION INTRAMUSCULAR; INTRAVENOUS; SUBCUTANEOUS
Status: DISCONTINUED | OUTPATIENT
Start: 2018-02-05 | End: 2018-02-06 | Stop reason: HOSPADM

## 2018-02-05 RX ORDER — SODIUM CHLORIDE 0.9 % (FLUSH) 0.9 %
3 SYRINGE (ML) INJECTION
Status: DISCONTINUED | OUTPATIENT
Start: 2018-02-05 | End: 2018-02-06

## 2018-02-05 RX ADMIN — SODIUM CHLORIDE, SODIUM LACTATE, POTASSIUM CHLORIDE, AND CALCIUM CHLORIDE: .6; .31; .03; .02 INJECTION, SOLUTION INTRAVENOUS at 09:02

## 2018-02-05 RX ADMIN — PROMETHAZINE HYDROCHLORIDE 25 MG: 25 INJECTION INTRAMUSCULAR; INTRAVENOUS at 03:02

## 2018-02-05 RX ADMIN — MIDAZOLAM 1 MG: 1 INJECTION INTRAMUSCULAR; INTRAVENOUS at 01:02

## 2018-02-05 RX ADMIN — FENTANYL CITRATE 50 MCG: 50 INJECTION, SOLUTION INTRAMUSCULAR; INTRAVENOUS at 12:02

## 2018-02-05 RX ADMIN — NITROGLYCERIN 200 MCG: 5 INJECTION, SOLUTION INTRAVENOUS at 01:02

## 2018-02-05 RX ADMIN — HYDROCODONE BITARTRATE AND ACETAMINOPHEN 2 TABLET: 5; 325 TABLET ORAL at 09:02

## 2018-02-05 RX ADMIN — HYDROMORPHONE HYDROCHLORIDE 1 MG: 1 INJECTION, SOLUTION INTRAMUSCULAR; INTRAVENOUS; SUBCUTANEOUS at 08:02

## 2018-02-05 RX ADMIN — DIPHENHYDRAMINE HYDROCHLORIDE 50 MG: 50 INJECTION, SOLUTION INTRAMUSCULAR; INTRAVENOUS at 12:02

## 2018-02-05 RX ADMIN — HYDROCODONE BITARTRATE AND ACETAMINOPHEN 2 TABLET: 5; 325 TABLET ORAL at 05:02

## 2018-02-05 RX ADMIN — NITROGLYCERIN 200 MCG: 5 INJECTION, SOLUTION INTRAVENOUS at 12:02

## 2018-02-05 RX ADMIN — AZITHROMYCIN 250 MG: 250 TABLET, FILM COATED ORAL at 08:02

## 2018-02-05 RX ADMIN — CIPROFLOXACIN 400 MG: 2 INJECTION, SOLUTION INTRAVENOUS at 12:02

## 2018-02-05 RX ADMIN — HEPARIN SODIUM 1000 UNITS: 1000 INJECTION, SOLUTION INTRAVENOUS; SUBCUTANEOUS at 01:02

## 2018-02-05 RX ADMIN — FENTANYL CITRATE 50 MCG: 50 INJECTION, SOLUTION INTRAMUSCULAR; INTRAVENOUS at 01:02

## 2018-02-05 RX ADMIN — MIDAZOLAM 1 MG: 1 INJECTION INTRAMUSCULAR; INTRAVENOUS at 12:02

## 2018-02-05 RX ADMIN — HYDROMORPHONE HYDROCHLORIDE 1 MG: 1 INJECTION, SOLUTION INTRAMUSCULAR; INTRAVENOUS; SUBCUTANEOUS at 06:02

## 2018-02-05 RX ADMIN — ONDANSETRON 8 MG: 2 INJECTION INTRAMUSCULAR; INTRAVENOUS at 12:02

## 2018-02-05 RX ADMIN — HYDROMORPHONE HYDROCHLORIDE 1 MG: 2 INJECTION INTRAMUSCULAR; INTRAVENOUS; SUBCUTANEOUS at 02:02

## 2018-02-05 RX ADMIN — HYDROCORTISONE SODIUM SUCCINATE 100 MG: 100 INJECTION, POWDER, FOR SOLUTION INTRAMUSCULAR; INTRAVENOUS at 12:02

## 2018-02-05 RX ADMIN — HEPARIN SODIUM 1000 UNITS: 1000 INJECTION, SOLUTION INTRAVENOUS; SUBCUTANEOUS at 12:02

## 2018-02-05 RX ADMIN — HEPARIN SODIUM 3000 UNITS: 1000 INJECTION, SOLUTION INTRAVENOUS; SUBCUTANEOUS at 12:02

## 2018-02-05 NOTE — TELEPHONE ENCOUNTER
Just spoke with Ms. Latif. Advised her that she will need to present to L&D admit desk at 830 AM tomorrow for admission for planned IR procedure prior to her scheduled hysterectomy on Tuesday. Advised her that she should be NPO at midnight. She also reported that she has had a cough/URI for which she has been taking a zpak RX'd last Thrusday. She denies fever/SOB. Reports cough is non-productive. Advised pt we will evaluate in the AM prior to IR procedure.    **Pt will be at L&D admit desk at 8:30 AM. Spoke with house supervisor tonight. He will create reservation. There is a Med/Surg bed now, but unsure if there will be in AM. Will call house tomorrow when pt arrives to determine placement on either med/surg or antepartum and advise pt.**

## 2018-02-05 NOTE — H&P
[facility]  Obstetrics & Gynecology  History & Physical    Patient Name: Parth Latif  MRN: [id number]  Admission Date: 2018  Primary Care Provider: Nils Barnes MD    Subjective:     Chief Complaint/Reason for Admission: placenta accreta, possible percreta    History of Present Illness:  Parth Latif is a 32 y.o. [id number] with placenta previa and accreta, suspected percreta with bladder involvement on imaging, who is s/p classical  18 and presents for pre-admission for interval completion hysterectomy scheduled for 18. She has been doing well from a postop standpoint since the . Reports mild cramping that started over the last 2 days and developed light VB this AM. Describes as a light period using 1 pad over the past 3 hrs, red-brown in appearance. Denies pain, fever, chills. Also reports recent URI for which she in on the last day of a z-pack and using albuterol PRN. Has cough with some chest congestion, otherwise URI symptoms have resolved.        Obstetric History       T2      L3     SAB0   TAB0   Ectopic0   Multiple0   Live Births3       # Outcome Date GA Lbr Anant/2nd Weight Sex Delivery Anes PTL Lv   3  18 34w2d  3.24 kg (7 lb 2.3 oz) M CS-Classical Spinal, EPI N HERO      Name: MIGUEL LATIF      Complications: Placenta percreta      Apgar1:  9                Apgar5: 9   2 Term  39w0d  3.629 kg (8 lb) F CS-LTranv  N HERO      Name: Gray   1 Term  39w0d  3.487 kg (7 lb 11 oz) M CS-LTranv  N HERO      Name: Evans      Complications: Breech presentation, antepartum        Past Medical History:   Diagnosis Date    MRSA (methicillin resistant staph aureus) culture positive      Past Surgical History:   Procedure Laterality Date     SECTION      x2     MANDIBLE SURGERY      TONSILLECTOMY         PTA Medications   Medication Sig    PNV,CALCIUM 72/IRON/FOLIC ACID (PRENATAL VITAMIN) Tab Take 1 tablet  by mouth once daily.       Review of patient's allergies indicates:   Allergen Reactions    Pcn [penicillins] Hives        Family History     Problem Relation (Age of Onset)    Hypertension Mother    No Known Problems Father        Social History Main Topics    Smoking status: Never Smoker    Smokeless tobacco: Never Used    Alcohol use No    Drug use: No    Sexual activity: Yes     Partners: Male     Birth control/ protection: IUD     Review of Systems   Constitutional: Negative for chills and fever.   Eyes: Negative for visual disturbance.   Respiratory: Negative for shortness of breath.    Cardiovascular: Negative for chest pain and palpitations.   Gastrointestinal: Negative for abdominal pain, nausea and vomiting.   Genitourinary: Positive for vaginal bleeding. Negative for vaginal discharge.   Musculoskeletal: Negative for back pain.   Neurological: Negative for seizures, syncope and headaches.   Hematological: Does not bruise/bleed easily.   Psychiatric/Behavioral: The patient is not nervous/anxious.       Objective:     BMI 39    Temp:  [96.8 °F (36 °C)] 96.8 °F (36 °C)  Pulse:  [108] 108  BP: (123)/(81) 123/81    Physical Exam:   Constitutional: She is oriented to person, place, and time. She appears well-developed and well-nourished. No distress.       Cardiovascular: Normal rate and regular rhythm.     Pulmonary/Chest: Effort normal. No respiratory distress.   Cough present, + congestion with coughing        Abdominal: Soft. Bowel sounds are normal. She exhibits distension (moderate, enalrged uterus palpable) and abdominal incision (vertical midline, well-healed). There is no tenderness. There is no rebound and no guarding.     Genitourinary:   Genitourinary Comments: SSE with small to moderate amount of brownish blood present in vault. No bright red or active bleeding noted.           Musculoskeletal: Moves all extremeties. She exhibits no edema.       Neurological: She is alert and oriented to  person, place, and time.    Skin: Skin is warm and dry.    Psychiatric: She has a normal mood and affect. Her behavior is normal.       Laboratory:  pending    Diagnostic Results:  none    Assessment/Plan:     * Placenta percreta    - admit to obstetrics  - CBC, BMP, T&S, PT/INR, fibrinogen now  - plan is for uterine artery embolization by IR today and HARIS/BS tomorrow by Ian Bowens and Kendrick  - bleeding light, monitor with pad counts. Expect to decreased after embolization.  - NPO w/ IVFs now until embolization complete and then again at midnight  - consents for HARIS/BS, possible cystoscopy or cystotomy with bladder repair, possible oophorectomy as well as blood transfusion signed and to chart. R/B/I/A reviewed and all questions answered.          URI (upper respiratory infection)    - symptoms improving with z-michele she has been taking as outpatient. Needs last dose tonight, order placed.  - albuterol nebs PRN            Khadijah Bautista MD  Obstetrics & Gynecology  [facility]

## 2018-02-05 NOTE — PROCEDURES
Radiology Post-Procedure Note    Pre Op Diagnosis: placenta percreta  Post Op Diagnosis: Same    Procedure: embolization    Procedure performed by: Collin Flores MD    Written Informed Consent Obtained: Yes  Specimen Removed: NO  Estimated Blood Loss: Minimal    Findings:   Successful placental embolization.  Of note, patient reported facial flushing during the procedure.  Benadryl give, Cipro D/C'd and symptoms resolved.  Cipro added to list of allergies.    Patient tolerated procedure well.    @SIG@

## 2018-02-05 NOTE — HPI
Parth Latif is a 32 y.o. [id number] with placenta previa and accreta, suspected percreta with bladder involvement on imaging, who is s/p classical  18 and presents for pre-admission for interval completion hysterectomy scheduled for 18. She has been doing well from a postop standpoint since the . Reports mild cramping that started over the last 2 days and developed light VB this AM. Describes as a light period using 1 pad over the past 3 hrs, red-brown in appearance. Denies pain, fever, chills. Also reports recent URI for which she in on the last day of a z-pack and using albuterol PRN. Has cough with some chest congestion, otherwise URI symptoms have resolved.

## 2018-02-05 NOTE — ANESTHESIA PREPROCEDURE EVALUATION
2018  Pre-operative evaluation for Procedure(s) (LRB):  HYSTERECTOMY-ABDOMINAL-TOTAL (HARIS) (N/A)  SALPINGO-OOPHERECTOMY (N/A)    Parth Latif is a 32 y.o. female [id number] with placenta previa and accreta, suspected percreta with bladder involvement on imaging, who is s/p classical  18 and presents for pre-admission for interval completion hysterectomy scheduled for 18.    Undergoing uterine artery embolization by IR today (18). No issues with procedure but noted to have mild allergic reaction to Cipro; rash resolved with Benadryl.     Had recent URI and is set to complete abx course tonight. States that her breathing is at baseline and has intermittent cough with phlegm production.     LDA: R hand 18g    Prev airway: None on file    Patient Active Problem List   Diagnosis    Abnormal placenta affecting management of mother in second trimester    Abscess of right thigh    History of MRSA infection    Allergy to clindamycin    Pregnancy    Placenta percreta    Status post classical  delivery    URI (upper respiratory infection)       Review of patient's allergies indicates:   Allergen Reactions    Ciprofloxacin Shortness Of Breath and Nausea Only    Pcn [penicillins] Hives        No current facility-administered medications on file prior to encounter.      Current Outpatient Prescriptions on File Prior to Encounter   Medication Sig Dispense Refill    PNV,CALCIUM 72/IRON/FOLIC ACID (PRENATAL VITAMIN) Tab Take 1 tablet by mouth once daily.         Past Surgical History:   Procedure Laterality Date     SECTION      x2     MANDIBLE SURGERY      TONSILLECTOMY         Social History     Social History    Marital status:      Spouse name: N/A    Number of children: N/A    Years of education: N/A     Occupational History    Not on file.     Social  History Main Topics    Smoking status: Never Smoker    Smokeless tobacco: Never Used    Alcohol use No    Drug use: No    Sexual activity: Yes     Partners: Male     Birth control/ protection: IUD     Other Topics Concern    Not on file     Social History Narrative    No narrative on file         Vital Signs Range (Last 24H):  Temp:  [36 °C (96.8 °F)]   Pulse:  []   Resp:  [16-24]   BP: (120-156)/(57-92)   SpO2:  [94 %-100 %]       CBC:   Recent Labs      02/05/18   0950   WBC  11.68   RBC  3.95*   HGB  11.0*   HCT  34.1*   PLT  206   MCV  86   MCH  27.8   MCHC  32.3       CMP:   Recent Labs      02/05/18   0950   NA  137   K  4.4   CL  106   CO2  21*   BUN  5*   CREATININE  0.6   GLU  81   CALCIUM  9.2       INR  Recent Labs      02/05/18   0950   INR  0.9       2D Echo: None on file      Anesthesia Evaluation    I have reviewed the Patient Summary Reports.     I have reviewed the Medications.     Review of Systems  Anesthesia Hx:  History of prior surgery of interest to airway management or planning: Denies Family Hx of Anesthesia complications.   Denies Personal Hx of Anesthesia complications.   EENT/Dental:EENT/Dental Normal   Cardiovascular:  Cardiovascular Normal     Pulmonary:   Denies Pneumonia Denies Asthma. Recent URI, resolved    Renal/:  Renal/ Normal     Hepatic/GI:  Hepatic/GI Normal    Musculoskeletal:  Musculoskeletal Normal    Neurological:  Neurology Normal    Endocrine:  Endocrine Normal        Physical Exam  General:  Obesity    Airway/Jaw/Neck:  Airway Findings: Mouth Opening: Normal Tongue: Normal  Mallampati: II  TM Distance: Normal, at least 6 cm  Jaw/Neck Findings:  Neck ROM: Normal ROM      Dental:  Dental Findings: In tact    Chest/Lungs:  Chest/Lungs Findings: (Do not appreciate any wheezing/rhonci throughout) Clear to auscultation, Normal Respiratory Rate     Heart/Vascular:  Heart Findings: Rate: Normal  Rhythm: Regular Rhythm        Mental Status:  Mental Status  Findings:  Cooperative, Alert and Oriented         Anesthesia Plan  Type of Anesthesia, risks & benefits discussed:  Anesthesia Type:  general  Patient's Preference:   Intra-op Monitoring Plan: standard ASA monitors and arterial line  Intra-op Monitoring Plan Comments:   Post Op Pain Control Plan: per primary service following discharge from PACU and IV/PO Opioids PRN  Post Op Pain Control Plan Comments:   Induction:   IV  Beta Blocker:  Patient is not currently on a Beta-Blocker (No further documentation required).       Informed Consent: Patient understands risks and agrees with Anesthesia plan.  Questions answered. Anesthesia consent signed with patient.  ASA Score: 3     Day of Surgery Review of History & Physical:    H&P update referred to the surgeon.         Ready For Surgery From Anesthesia Perspective.

## 2018-02-05 NOTE — H&P
Consult/H&P Note  Interventional Radiology    Consult Requested By: Kwan    Reason for Consult: placenta percreta    SUBJECTIVE:     Chief Complaint: placenta percreta    History of Present Illness: 33 yo F with placenta percreta s/p delivery  via C section with placenta in situ.  Returns for completion hysterectomy following repeat placental embolization.    Past Medical History:   Diagnosis Date    MRSA (methicillin resistant staph aureus) culture positive      Past Surgical History:   Procedure Laterality Date     SECTION      x2     MANDIBLE SURGERY      TONSILLECTOMY       Family History   Problem Relation Age of Onset    Hypertension Mother     No Known Problems Father      Social History   Substance Use Topics    Smoking status: Never Smoker    Smokeless tobacco: Never Used    Alcohol use No       Review of Systems:  Constitutional/General:No fever, chills, change in appetite or weight loss.  Hematological/Immuno: no known coagulopathies  Respiratory: no shortness of breath  Cardiovascular: no chest pain  Gastrointestinal: no abdominal pain  Genito-Urinary: no dysuria  Neurological: no TIA or stroke symptoms  Psychiatric: normal mood/affect, good insight/judgement      OBJECTIVE:     Vital Signs Range (Last 24H):  Temp:  [96.8 °F (36 °C)]   Pulse:  [108]   Resp:  [18]   BP: (123)/(81)   SpO2:  [100 %]     Physical Exam:  General- Patient alert and oriented x3 in NAD  ENT- PERRLA,  Neck- No masses  CV- Regular rate and rhythm  Resp-  No increased WOB  Neuro-  No focal deficits noted.     Physical Exam  There is no height or weight on file to calculate BMI.    Scheduled Meds:    azithromycin  250 mg Oral Once     Continuous Infusions:    lactated ringers       PRN Meds:albuterol sulfate, ondansetron, sodium chloride 0.9%    Allergies:   Review of patient's allergies indicates:   Allergen Reactions    Pcn [penicillins] Hives       Labs:  No results for input(s): INR in the last 168  hours.    Invalid input(s):  PT,  PTT    Recent Labs  Lab 01/29/18  1145   WBC 12.68   HGB 11.4*   HCT 35.5*   MCV 87       No results for input(s): GLU, NA, K, CL, CO2, BUN, CREATININE, CALCIUM, MG, ALT, AST, ALBUMIN, BILITOT, BILIDIR in the last 168 hours.    Vitals (Most Recent):  Temp: 96.8 °F (36 °C) (02/05/18 0953)  Pulse: 108 (02/05/18 0953)  Resp: 18 (02/05/18 0953)  BP: 123/81 (02/05/18 0953)  SpO2: 100 % (02/05/18 0953)    ASA: 2  Mallampati: 2    Consent obtained    ASSESSMENT/PLAN:     Embolization of placenta in situ. Moderate sedation.    Active Hospital Problems    Diagnosis  POA    *Placenta percreta [O43.239]  Yes    URI (upper respiratory infection) [J06.9]  Unknown      Resolved Hospital Problems    Diagnosis Date Resolved POA   No resolved problems to display.           Collin Flores MD

## 2018-02-05 NOTE — PROGRESS NOTES
Pt returned to floor via transport.  Pt escorted to her bed.  Pt reports pain 5/10 but states does not want any medication at this time.  VSS upon return.

## 2018-02-05 NOTE — SUBJECTIVE & OBJECTIVE
Obstetric History       T2      L3     SAB0   TAB0   Ectopic0   Multiple0   Live Births3       # Outcome Date GA Lbr Anant/2nd Weight Sex Delivery Anes PTL Lv   3  18 34w2d  3.24 kg (7 lb 2.3 oz) M CS-Classical Spinal, EPI N HERO      Name: MIGUEL GODINEZ      Complications: Placenta percreta      Apgar1:  9                Apgar5: 9   2 Term  39w0d  3.629 kg (8 lb) F CS-LTranv  N HERO      Name: Gray   1 Term  39w0d  3.487 kg (7 lb 11 oz) M CS-LTranv  N HERO      Name: Carter      Complications: Breech presentation, antepartum        Past Medical History:   Diagnosis Date    MRSA (methicillin resistant staph aureus) culture positive      Past Surgical History:   Procedure Laterality Date     SECTION      x2     MANDIBLE SURGERY      TONSILLECTOMY         PTA Medications   Medication Sig    PNV,CALCIUM 72/IRON/FOLIC ACID (PRENATAL VITAMIN) Tab Take 1 tablet by mouth once daily.       Review of patient's allergies indicates:   Allergen Reactions    Pcn [penicillins] Hives        Family History     Problem Relation (Age of Onset)    Hypertension Mother    No Known Problems Father        Social History Main Topics    Smoking status: Never Smoker    Smokeless tobacco: Never Used    Alcohol use No    Drug use: No    Sexual activity: Yes     Partners: Male     Birth control/ protection: IUD     Review of Systems   Constitutional: Negative for chills and fever.   Eyes: Negative for visual disturbance.   Respiratory: Negative for shortness of breath.    Cardiovascular: Negative for chest pain and palpitations.   Gastrointestinal: Negative for abdominal pain, nausea and vomiting.   Genitourinary: Positive for vaginal bleeding. Negative for vaginal discharge.   Musculoskeletal: Negative for back pain.   Neurological: Negative for seizures, syncope and headaches.   Hematological: Does not bruise/bleed easily.   Psychiatric/Behavioral: The patient is not  nervous/anxious.       Objective:     BMI 39    Temp:  [96.8 °F (36 °C)] 96.8 °F (36 °C)  Pulse:  [108] 108  BP: (123)/(81) 123/81    Physical Exam:   Constitutional: She is oriented to person, place, and time. She appears well-developed and well-nourished. No distress.       Cardiovascular: Normal rate and regular rhythm.     Pulmonary/Chest: Effort normal. No respiratory distress.   Cough present, + congestion with coughing        Abdominal: Soft. Bowel sounds are normal. She exhibits distension (moderate, enalrged uterus palpable) and abdominal incision (vertical midline, well-healed). There is no tenderness. There is no rebound and no guarding.     Genitourinary:   Genitourinary Comments: SSE with small to moderate amount of brownish blood present in vault. No bright red or active bleeding noted.           Musculoskeletal: Moves all extremeties. She exhibits no edema.       Neurological: She is alert and oriented to person, place, and time.    Skin: Skin is warm and dry.    Psychiatric: She has a normal mood and affect. Her behavior is normal.       Laboratory:  pending    Diagnostic Results:  none

## 2018-02-05 NOTE — ASSESSMENT & PLAN NOTE
- symptoms improving with z-michele she has been taking as outpatient. Needs last dose tonight, order placed.  - albuterol nebs PRN

## 2018-02-05 NOTE — ASSESSMENT & PLAN NOTE
- admit to obstetrics  - CBC, BMP, T&S, PT/INR, fibrinogen now  - plan is for uterine artery embolization by IR today and HARIS/BS tomorrow by Ian Bowens and Kendrick  - bleeding light, monitor with pad counts. Expect to decreased after embolization.  - NPO w/ IVFs now until embolization complete and then again at midnight  - consents for HARIS/BS, possible cystoscopy or cystotomy with bladder repair, possible oophorectomy as well as blood transfusion signed and to chart. R/B/I/A reviewed and all questions answered.

## 2018-02-06 ENCOUNTER — SURGERY (OUTPATIENT)
Age: 33
End: 2018-02-06

## 2018-02-06 ENCOUNTER — ANESTHESIA (OUTPATIENT)
Dept: SURGERY | Facility: OTHER | Age: 33
DRG: 769 | End: 2018-02-06
Payer: COMMERCIAL

## 2018-02-06 PROBLEM — O43.219 PLACENTA ACCRETA: Status: ACTIVE | Noted: 2018-01-04

## 2018-02-06 PROBLEM — Z90.710 S/P TAH (TOTAL ABDOMINAL HYSTERECTOMY): Status: ACTIVE | Noted: 2018-02-06

## 2018-02-06 PROBLEM — O43.233: Status: ACTIVE | Noted: 2018-02-06

## 2018-02-06 PROBLEM — O43.233: Status: ACTIVE | Noted: 2018-02-04

## 2018-02-06 PROBLEM — O43.109 PLACENTAL ABNORMALITY: Status: RESOLVED | Noted: 2018-02-05 | Resolved: 2018-02-06

## 2018-02-06 LAB
BASOPHILS # BLD AUTO: 0 K/UL
BASOPHILS NFR BLD: 0 %
DIFFERENTIAL METHOD: ABNORMAL
EOSINOPHIL # BLD AUTO: 0 K/UL
EOSINOPHIL NFR BLD: 0.1 %
ERYTHROCYTE [DISTWIDTH] IN BLOOD BY AUTOMATED COUNT: 14.6 %
FIBRINOGEN PPP-MCNC: 374 MG/DL
HCT VFR BLD AUTO: 31.9 %
HGB BLD-MCNC: 10.5 G/DL
INR PPP: 0.9
LYMPHOCYTES # BLD AUTO: 1.1 K/UL
LYMPHOCYTES NFR BLD: 7 %
MCH RBC QN AUTO: 28.4 PG
MCHC RBC AUTO-ENTMCNC: 32.9 G/DL
MCV RBC AUTO: 86 FL
MONOCYTES # BLD AUTO: 0.4 K/UL
MONOCYTES NFR BLD: 2.5 %
NEUTROPHILS # BLD AUTO: 14.4 K/UL
NEUTROPHILS NFR BLD: 89.9 %
PLATELET # BLD AUTO: 183 K/UL
PMV BLD AUTO: 9.2 FL
PROTHROMBIN TIME: 10.5 SEC
RBC # BLD AUTO: 3.7 M/UL
WBC # BLD AUTO: 16 K/UL

## 2018-02-06 PROCEDURE — 71000039 HC RECOVERY, EACH ADD'L HOUR: Performed by: OBSTETRICS & GYNECOLOGY

## 2018-02-06 PROCEDURE — 63600175 PHARM REV CODE 636 W HCPCS: Performed by: STUDENT IN AN ORGANIZED HEALTH CARE EDUCATION/TRAINING PROGRAM

## 2018-02-06 PROCEDURE — S0020 INJECTION, BUPIVICAINE HYDRO: HCPCS | Performed by: OBSTETRICS & GYNECOLOGY

## 2018-02-06 PROCEDURE — 25000003 PHARM REV CODE 250: Performed by: STUDENT IN AN ORGANIZED HEALTH CARE EDUCATION/TRAINING PROGRAM

## 2018-02-06 PROCEDURE — 63600175 PHARM REV CODE 636 W HCPCS: Performed by: OBSTETRICS & GYNECOLOGY

## 2018-02-06 PROCEDURE — 63600175 PHARM REV CODE 636 W HCPCS: Performed by: RADIOLOGY

## 2018-02-06 PROCEDURE — 85025 COMPLETE CBC W/AUTO DIFF WBC: CPT

## 2018-02-06 PROCEDURE — 27201423 OPTIME MED/SURG SUP & DEVICES STERILE SUPPLY: Performed by: OBSTETRICS & GYNECOLOGY

## 2018-02-06 PROCEDURE — C9290 INJ, BUPIVACAINE LIPOSOME: HCPCS | Performed by: OBSTETRICS & GYNECOLOGY

## 2018-02-06 PROCEDURE — 85384 FIBRINOGEN ACTIVITY: CPT

## 2018-02-06 PROCEDURE — 37000008 HC ANESTHESIA 1ST 15 MINUTES: Performed by: OBSTETRICS & GYNECOLOGY

## 2018-02-06 PROCEDURE — 85610 PROTHROMBIN TIME: CPT

## 2018-02-06 PROCEDURE — 88307 TISSUE EXAM BY PATHOLOGIST: CPT | Mod: 26,,, | Performed by: PATHOLOGY

## 2018-02-06 PROCEDURE — 88307 TISSUE EXAM BY PATHOLOGIST: CPT | Performed by: PATHOLOGY

## 2018-02-06 PROCEDURE — 36000709 HC OR TIME LEV III EA ADD 15 MIN: Performed by: OBSTETRICS & GYNECOLOGY

## 2018-02-06 PROCEDURE — 25000003 PHARM REV CODE 250: Performed by: RADIOLOGY

## 2018-02-06 PROCEDURE — D9220A PRA ANESTHESIA: Mod: ,,, | Performed by: ANESTHESIOLOGY

## 2018-02-06 PROCEDURE — 0UT90ZZ RESECTION OF UTERUS, OPEN APPROACH: ICD-10-PCS | Performed by: OBSTETRICS & GYNECOLOGY

## 2018-02-06 PROCEDURE — 0TQB0ZZ REPAIR BLADDER, OPEN APPROACH: ICD-10-PCS | Performed by: OBSTETRICS & GYNECOLOGY

## 2018-02-06 PROCEDURE — 88342 IMHCHEM/IMCYTCHM 1ST ANTB: CPT | Performed by: PATHOLOGY

## 2018-02-06 PROCEDURE — 37000009 HC ANESTHESIA EA ADD 15 MINS: Performed by: OBSTETRICS & GYNECOLOGY

## 2018-02-06 PROCEDURE — 71000033 HC RECOVERY, INTIAL HOUR: Performed by: OBSTETRICS & GYNECOLOGY

## 2018-02-06 PROCEDURE — 36620 INSERTION CATHETER ARTERY: CPT | Performed by: ANESTHESIOLOGY

## 2018-02-06 PROCEDURE — 58956 BSO OMENTECTOMY W/TAH: CPT | Mod: ,,, | Performed by: OBSTETRICS & GYNECOLOGY

## 2018-02-06 PROCEDURE — 11000001 HC ACUTE MED/SURG PRIVATE ROOM

## 2018-02-06 PROCEDURE — 36000708 HC OR TIME LEV III 1ST 15 MIN: Performed by: OBSTETRICS & GYNECOLOGY

## 2018-02-06 PROCEDURE — 25000003 PHARM REV CODE 250: Performed by: OBSTETRICS & GYNECOLOGY

## 2018-02-06 PROCEDURE — 58956 BSO OMENTECTOMY W/TAH: CPT | Mod: 82,,, | Performed by: OBSTETRICS & GYNECOLOGY

## 2018-02-06 PROCEDURE — 88342 IMHCHEM/IMCYTCHM 1ST ANTB: CPT | Mod: 26,,, | Performed by: PATHOLOGY

## 2018-02-06 PROCEDURE — 36620 INSERTION CATHETER ARTERY: CPT | Mod: 59,,, | Performed by: ANESTHESIOLOGY

## 2018-02-06 PROCEDURE — P9021 RED BLOOD CELLS UNIT: HCPCS

## 2018-02-06 PROCEDURE — 0UTC0ZZ RESECTION OF CERVIX, OPEN APPROACH: ICD-10-PCS | Performed by: OBSTETRICS & GYNECOLOGY

## 2018-02-06 RX ORDER — SODIUM CHLORIDE 0.9 % (FLUSH) 0.9 %
3 SYRINGE (ML) INJECTION
Status: DISCONTINUED | OUTPATIENT
Start: 2018-02-06 | End: 2018-02-06 | Stop reason: HOSPADM

## 2018-02-06 RX ORDER — ACETAMINOPHEN 10 MG/ML
1000 INJECTION, SOLUTION INTRAVENOUS EVERY 8 HOURS
Status: DISCONTINUED | OUTPATIENT
Start: 2018-02-06 | End: 2018-02-06

## 2018-02-06 RX ORDER — HYDROMORPHONE HYDROCHLORIDE 1 MG/ML
1 INJECTION, SOLUTION INTRAMUSCULAR; INTRAVENOUS; SUBCUTANEOUS
Status: DISCONTINUED | OUTPATIENT
Start: 2018-02-06 | End: 2018-02-07

## 2018-02-06 RX ORDER — METOCLOPRAMIDE HYDROCHLORIDE 5 MG/ML
5 INJECTION INTRAMUSCULAR; INTRAVENOUS EVERY 6 HOURS PRN
Status: DISCONTINUED | OUTPATIENT
Start: 2018-02-06 | End: 2018-02-09 | Stop reason: HOSPADM

## 2018-02-06 RX ORDER — HYDROMORPHONE HYDROCHLORIDE 1 MG/ML
1 INJECTION, SOLUTION INTRAMUSCULAR; INTRAVENOUS; SUBCUTANEOUS EVERY 4 HOURS PRN
Status: DISCONTINUED | OUTPATIENT
Start: 2018-02-06 | End: 2018-02-06

## 2018-02-06 RX ORDER — ACETAMINOPHEN 10 MG/ML
1000 INJECTION, SOLUTION INTRAVENOUS EVERY 8 HOURS
Status: DISCONTINUED | OUTPATIENT
Start: 2018-02-06 | End: 2018-02-07

## 2018-02-06 RX ORDER — DEXAMETHASONE SODIUM PHOSPHATE 4 MG/ML
INJECTION, SOLUTION INTRA-ARTICULAR; INTRALESIONAL; INTRAMUSCULAR; INTRAVENOUS; SOFT TISSUE
Status: DISCONTINUED | OUTPATIENT
Start: 2018-02-06 | End: 2018-02-06

## 2018-02-06 RX ORDER — SODIUM CITRATE AND CITRIC ACID MONOHYDRATE 334; 500 MG/5ML; MG/5ML
SOLUTION ORAL
Status: DISPENSED
Start: 2018-02-06 | End: 2018-02-06

## 2018-02-06 RX ORDER — CEFAZOLIN SODIUM 2 G/50ML
2 SOLUTION INTRAVENOUS
Status: DISCONTINUED | OUTPATIENT
Start: 2018-02-06 | End: 2018-02-06

## 2018-02-06 RX ORDER — NEOSTIGMINE METHYLSULFATE 1 MG/ML
INJECTION, SOLUTION INTRAVENOUS
Status: DISCONTINUED | OUTPATIENT
Start: 2018-02-06 | End: 2018-02-06

## 2018-02-06 RX ORDER — SUCCINYLCHOLINE CHLORIDE 20 MG/ML
INJECTION INTRAMUSCULAR; INTRAVENOUS
Status: DISCONTINUED | OUTPATIENT
Start: 2018-02-06 | End: 2018-02-06

## 2018-02-06 RX ORDER — OXYBUTYNIN CHLORIDE 5 MG/1
5 TABLET ORAL 3 TIMES DAILY PRN
Status: DISCONTINUED | OUTPATIENT
Start: 2018-02-06 | End: 2018-02-07

## 2018-02-06 RX ORDER — ROCURONIUM BROMIDE 10 MG/ML
INJECTION, SOLUTION INTRAVENOUS
Status: DISCONTINUED | OUTPATIENT
Start: 2018-02-06 | End: 2018-02-06

## 2018-02-06 RX ORDER — ONDANSETRON 8 MG/1
8 TABLET, ORALLY DISINTEGRATING ORAL EVERY 8 HOURS PRN
Status: DISCONTINUED | OUTPATIENT
Start: 2018-02-06 | End: 2018-02-09 | Stop reason: HOSPADM

## 2018-02-06 RX ORDER — KETOROLAC TROMETHAMINE 30 MG/ML
INJECTION, SOLUTION INTRAMUSCULAR; INTRAVENOUS
Status: DISCONTINUED | OUTPATIENT
Start: 2018-02-06 | End: 2018-02-06

## 2018-02-06 RX ORDER — HYDROMORPHONE HYDROCHLORIDE 1 MG/ML
1 INJECTION, SOLUTION INTRAMUSCULAR; INTRAVENOUS; SUBCUTANEOUS
Status: DISCONTINUED | OUTPATIENT
Start: 2018-02-06 | End: 2018-02-06 | Stop reason: RX

## 2018-02-06 RX ORDER — SODIUM CHLORIDE, SODIUM LACTATE, POTASSIUM CHLORIDE, CALCIUM CHLORIDE 600; 310; 30; 20 MG/100ML; MG/100ML; MG/100ML; MG/100ML
INJECTION, SOLUTION INTRAVENOUS CONTINUOUS
Status: DISCONTINUED | OUTPATIENT
Start: 2018-02-06 | End: 2018-02-07

## 2018-02-06 RX ORDER — SIMETHICONE 80 MG
80 TABLET,CHEWABLE ORAL EVERY 4 HOURS PRN
Status: DISCONTINUED | OUTPATIENT
Start: 2018-02-06 | End: 2018-02-09 | Stop reason: HOSPADM

## 2018-02-06 RX ORDER — DOCUSATE SODIUM 100 MG/1
200 CAPSULE, LIQUID FILLED ORAL 2 TIMES DAILY
Status: DISCONTINUED | OUTPATIENT
Start: 2018-02-07 | End: 2018-02-09 | Stop reason: HOSPADM

## 2018-02-06 RX ORDER — FAMOTIDINE 10 MG/ML
INJECTION INTRAVENOUS
Status: DISPENSED
Start: 2018-02-06 | End: 2018-02-06

## 2018-02-06 RX ORDER — GLYCOPYRROLATE 0.2 MG/ML
INJECTION INTRAMUSCULAR; INTRAVENOUS
Status: DISCONTINUED | OUTPATIENT
Start: 2018-02-06 | End: 2018-02-06

## 2018-02-06 RX ORDER — DIPHENHYDRAMINE HCL 25 MG
25 CAPSULE ORAL EVERY 4 HOURS PRN
Status: DISCONTINUED | OUTPATIENT
Start: 2018-02-06 | End: 2018-02-09 | Stop reason: HOSPADM

## 2018-02-06 RX ORDER — HYDROMORPHONE HYDROCHLORIDE 2 MG/ML
0.2 INJECTION, SOLUTION INTRAMUSCULAR; INTRAVENOUS; SUBCUTANEOUS EVERY 5 MIN PRN
Status: DISCONTINUED | OUTPATIENT
Start: 2018-02-06 | End: 2018-02-06 | Stop reason: HOSPADM

## 2018-02-06 RX ORDER — MUPIROCIN 20 MG/G
1 OINTMENT TOPICAL 2 TIMES DAILY
Status: DISCONTINUED | OUTPATIENT
Start: 2018-02-06 | End: 2018-02-09 | Stop reason: HOSPADM

## 2018-02-06 RX ORDER — SODIUM CHLORIDE 9 MG/ML
INJECTION, SOLUTION INTRAVENOUS CONTINUOUS PRN
Status: DISCONTINUED | OUTPATIENT
Start: 2018-02-06 | End: 2018-02-06

## 2018-02-06 RX ORDER — ACETAMINOPHEN 10 MG/ML
INJECTION, SOLUTION INTRAVENOUS
Status: DISCONTINUED | OUTPATIENT
Start: 2018-02-06 | End: 2018-02-06

## 2018-02-06 RX ORDER — BUPIVACAINE HYDROCHLORIDE 2.5 MG/ML
INJECTION, SOLUTION INFILTRATION; PERINEURAL
Status: DISCONTINUED | OUTPATIENT
Start: 2018-02-06 | End: 2018-02-06 | Stop reason: HOSPADM

## 2018-02-06 RX ORDER — SODIUM CHLORIDE, SODIUM LACTATE, POTASSIUM CHLORIDE, CALCIUM CHLORIDE 600; 310; 30; 20 MG/100ML; MG/100ML; MG/100ML; MG/100ML
INJECTION, SOLUTION INTRAVENOUS CONTINUOUS PRN
Status: DISCONTINUED | OUTPATIENT
Start: 2018-02-06 | End: 2018-02-06

## 2018-02-06 RX ORDER — PROPOFOL 10 MG/ML
VIAL (ML) INTRAVENOUS
Status: DISCONTINUED | OUTPATIENT
Start: 2018-02-06 | End: 2018-02-06

## 2018-02-06 RX ORDER — LIDOCAINE HCL/PF 100 MG/5ML
SYRINGE (ML) INTRAVENOUS
Status: DISCONTINUED | OUTPATIENT
Start: 2018-02-06 | End: 2018-02-06

## 2018-02-06 RX ORDER — HYDROMORPHONE HYDROCHLORIDE 2 MG/ML
INJECTION, SOLUTION INTRAMUSCULAR; INTRAVENOUS; SUBCUTANEOUS
Status: DISCONTINUED | OUTPATIENT
Start: 2018-02-06 | End: 2018-02-06

## 2018-02-06 RX ADMIN — FENTANYL CITRATE 100 MCG: 50 INJECTION, SOLUTION INTRAMUSCULAR; INTRAVENOUS at 12:02

## 2018-02-06 RX ADMIN — DEXTROSE 2 G: 50 INJECTION, SOLUTION INTRAVENOUS at 01:02

## 2018-02-06 RX ADMIN — HYDROMORPHONE HYDROCHLORIDE 1 MG: 1 INJECTION, SOLUTION INTRAMUSCULAR; INTRAVENOUS; SUBCUTANEOUS at 06:02

## 2018-02-06 RX ADMIN — ACETAMINOPHEN 1000 MG: 10 INJECTION, SOLUTION INTRAVENOUS at 01:02

## 2018-02-06 RX ADMIN — HYDROMORPHONE HYDROCHLORIDE 0.2 MG: 2 INJECTION, SOLUTION INTRAMUSCULAR; INTRAVENOUS; SUBCUTANEOUS at 05:02

## 2018-02-06 RX ADMIN — SODIUM CHLORIDE: 0.9 INJECTION, SOLUTION INTRAVENOUS at 02:02

## 2018-02-06 RX ADMIN — ROCURONIUM BROMIDE 50 MG: 10 INJECTION, SOLUTION INTRAVENOUS at 01:02

## 2018-02-06 RX ADMIN — SODIUM CHLORIDE, SODIUM LACTATE, POTASSIUM CHLORIDE, AND CALCIUM CHLORIDE: .6; .31; .03; .02 INJECTION, SOLUTION INTRAVENOUS at 06:02

## 2018-02-06 RX ADMIN — NEOSTIGMINE METHYLSULFATE 5 MG: 1 INJECTION INTRAVENOUS at 03:02

## 2018-02-06 RX ADMIN — HYDROMORPHONE HYDROCHLORIDE 0.6 MG: 2 INJECTION, SOLUTION INTRAMUSCULAR; INTRAVENOUS; SUBCUTANEOUS at 04:02

## 2018-02-06 RX ADMIN — DEXAMETHASONE SODIUM PHOSPHATE 8 MG: 4 INJECTION, SOLUTION INTRAMUSCULAR; INTRAVENOUS at 01:02

## 2018-02-06 RX ADMIN — SUCCINYLCHOLINE CHLORIDE 120 MG: 20 INJECTION, SOLUTION INTRAMUSCULAR; INTRAVENOUS at 12:02

## 2018-02-06 RX ADMIN — ROCURONIUM BROMIDE 20 MG: 10 INJECTION, SOLUTION INTRAVENOUS at 03:02

## 2018-02-06 RX ADMIN — HYDROMORPHONE HYDROCHLORIDE 1 MG: 1 INJECTION, SOLUTION INTRAMUSCULAR; INTRAVENOUS; SUBCUTANEOUS at 04:02

## 2018-02-06 RX ADMIN — BUPIVACAINE 20 ML: 13.3 INJECTION, SUSPENSION, LIPOSOMAL INFILTRATION at 03:02

## 2018-02-06 RX ADMIN — SODIUM CHLORIDE, SODIUM LACTATE, POTASSIUM CHLORIDE, AND CALCIUM CHLORIDE: .6; .31; .03; .02 INJECTION, SOLUTION INTRAVENOUS at 12:02

## 2018-02-06 RX ADMIN — ROCURONIUM BROMIDE 30 MG: 10 INJECTION, SOLUTION INTRAVENOUS at 01:02

## 2018-02-06 RX ADMIN — FENTANYL CITRATE 100 MCG: 50 INJECTION, SOLUTION INTRAMUSCULAR; INTRAVENOUS at 02:02

## 2018-02-06 RX ADMIN — HYDROMORPHONE HYDROCHLORIDE 1 MG: 1 INJECTION, SOLUTION INTRAMUSCULAR; INTRAVENOUS; SUBCUTANEOUS at 12:02

## 2018-02-06 RX ADMIN — SODIUM CHLORIDE: 0.9 INJECTION, SOLUTION INTRAVENOUS at 01:02

## 2018-02-06 RX ADMIN — MIDAZOLAM 2 MG: 1 INJECTION INTRAMUSCULAR; INTRAVENOUS at 12:02

## 2018-02-06 RX ADMIN — KETOROLAC TROMETHAMINE 30 MG: 30 INJECTION, SOLUTION INTRAMUSCULAR; INTRAVENOUS at 03:02

## 2018-02-06 RX ADMIN — HYDROMORPHONE HYDROCHLORIDE 1 MG: 1 INJECTION, SOLUTION INTRAMUSCULAR; INTRAVENOUS; SUBCUTANEOUS at 10:02

## 2018-02-06 RX ADMIN — SODIUM CHLORIDE, SODIUM LACTATE, POTASSIUM CHLORIDE, AND CALCIUM CHLORIDE: 600; 310; 30; 20 INJECTION, SOLUTION INTRAVENOUS at 01:02

## 2018-02-06 RX ADMIN — ROCURONIUM BROMIDE 20 MG: 10 INJECTION, SOLUTION INTRAVENOUS at 01:02

## 2018-02-06 RX ADMIN — ACETAMINOPHEN 1000 MG: 10 INJECTION, SOLUTION INTRAVENOUS at 10:02

## 2018-02-06 RX ADMIN — PROPOFOL 150 MG: 10 INJECTION, EMULSION INTRAVENOUS at 12:02

## 2018-02-06 RX ADMIN — ROCURONIUM BROMIDE 20 MG: 10 INJECTION, SOLUTION INTRAVENOUS at 02:02

## 2018-02-06 RX ADMIN — SODIUM CHLORIDE, SODIUM LACTATE, POTASSIUM CHLORIDE, AND CALCIUM CHLORIDE: 600; 310; 30; 20 INJECTION, SOLUTION INTRAVENOUS at 12:02

## 2018-02-06 RX ADMIN — METOCLOPRAMIDE 5 MG: 5 INJECTION, SOLUTION INTRAMUSCULAR; INTRAVENOUS at 07:02

## 2018-02-06 RX ADMIN — MUPIROCIN 1 G: 20 OINTMENT TOPICAL at 10:02

## 2018-02-06 RX ADMIN — FENTANYL CITRATE 50 MCG: 50 INJECTION, SOLUTION INTRAMUSCULAR; INTRAVENOUS at 01:02

## 2018-02-06 RX ADMIN — BUPIVACAINE HYDROCHLORIDE 30 ML: 2.5 INJECTION, SOLUTION INFILTRATION; PERINEURAL at 03:02

## 2018-02-06 RX ADMIN — GLYCOPYRROLATE 0.6 MG: 0.2 INJECTION, SOLUTION INTRAMUSCULAR; INTRAVENOUS at 04:02

## 2018-02-06 RX ADMIN — HYDROCODONE BITARTRATE AND ACETAMINOPHEN 2 TABLET: 5; 325 TABLET ORAL at 02:02

## 2018-02-06 RX ADMIN — HYDROMORPHONE HYDROCHLORIDE 0.4 MG: 2 INJECTION, SOLUTION INTRAMUSCULAR; INTRAVENOUS; SUBCUTANEOUS at 03:02

## 2018-02-06 RX ADMIN — ONDANSETRON 4 MG: 2 INJECTION INTRAMUSCULAR; INTRAVENOUS at 03:02

## 2018-02-06 RX ADMIN — DEXTROSE 2 G: 50 INJECTION, SOLUTION INTRAVENOUS at 04:02

## 2018-02-06 RX ADMIN — LIDOCAINE HYDROCHLORIDE 50 MG: 20 INJECTION, SOLUTION INTRAVENOUS at 12:02

## 2018-02-06 RX ADMIN — PROMETHAZINE HYDROCHLORIDE 6.25 MG: 25 INJECTION INTRAMUSCULAR; INTRAVENOUS at 04:02

## 2018-02-06 RX ADMIN — IBUPROFEN 800 MG: 800 INJECTION INTRAVENOUS at 08:02

## 2018-02-06 RX ADMIN — HYDROCODONE BITARTRATE AND ACETAMINOPHEN 2 TABLET: 5; 325 TABLET ORAL at 06:02

## 2018-02-06 NOTE — ANESTHESIA PROCEDURE NOTES
Arterial    Diagnosis: Placenta Percreta    Procedure start time: 2/6/2018 1:00 PM  Timeout: 2/6/2018 1:00 PM  Procedure end time: 2/6/2018 1:05 PM  Staffing  Anesthesiologist: SRUTHI RAYMOND  Resident/CRNA: MILADY BERRY  Performed: resident/CRNA and anesthesiologist   Anesthesiologist was present at the time of the procedure.  Preanesthetic Checklist  Completed: patient identified, site marked, surgical consent, pre-op evaluation, timeout performed, IV checked, risks and benefits discussed, monitors and equipment checked and anesthesia consent givenArterial  Skin Prep: chlorhexidine gluconate  Local Infiltration: none  Orientation: right  Location: radial  Catheter Size: 18 G  Catheter placement by Anatomical landmarks. Heme positive aspiration all ports.Insertion Attempts: 1  Assessment  Dressing: secured with tape and tegaderm  Patient: Tolerated well

## 2018-02-06 NOTE — OP NOTE
Certification of Assistant at Surgery       Surgery Date: 2018     Participating Surgeons:  Surgeon(s) and Role:     * Lamont Bowens MD - Primary     * Milagros Marks MD - Assisting     * Khadijah Bautista MD - Resident - Assisting     * Mike Eric MD - Assisting    Procedures:  Procedure(s) (LRB):  HYSTERECTOMY-ABDOMINAL-TOTAL (HARIS) (N/A)  REPAIR-BLADDER (N/A)    Assistant Surgeon's Certification of Necessity:  I understand that section 1842 (b) (6) (d) of the Social Security Act generally prohibits Medicare Part B reasonable charge payment for the services of assistants at surgery in teaching hospitals when qualified residents are available to furnish such services. I certify that the services for which payment is claimed were medically necessary, and that no qualified resident was available to perform the services. I further understand that these services are subject to post-payment review by the Medicare carrier.      Lamont Bowens MD    2018  9:08 PM  DATE OF PROCEDURE:  2018.     SURGEON:  Milagros Marks M.D.     ASSISTANTS:    1. Lamont Bowens MD- no qualified resident available for his portion of theprocedureAna 2. 2. Khadijah Bautista MD (RES)  3. Mike Eric MD ([facility] attending)     PREOPERATIVE DIAGNOSIS:    1. Known placenta previa and placenta percreta  2. S/p classical  section 18  3. S/p uterine artery embolization      POSTOPERATIVE DIAGNOSIS:    1. Known placenta previa and placenta percreta  2. S/p classical  section 18  3. S/p uterine artery embolization     PROCEDURES PERFORMED: Interval radical hysterectomy, lysis of adhesions, cystotomy repair      ANESTHESIA:  General endotracheal anesthesia.     SPECIMENS REMOVED:  1.  Uterus and cervix.     ESTIMATED BLOOD LOSS:  1500 mL.    DRAINS: none, 24F viera in place    BLOOD PRODUCTS: 1u PRBCs       FINDINGS:  Upon exploration of the abdomen there was a small amount of omentum adhered to the prior   scar. The placenta extensively involved the lower uterine segment, extended into the right parametria and posterior wall of the bladder. Normal appearing fallopian tubes and ovaries bilaterally. Normal cervix. At the conclusion of the procedure placental tissue had been resected in its entirety with radical hysterectomy/        PROCEDURE IN DETAIL:  T  he patient was taken to the operating room.  Informed consent had been obtained.  She underwent endotracheal anesthesia without difficulty, was prepped and draped in the normal sterile fashion in dorsal lithotomy position.  Timeout was performed.  All parties agreed to the planned procedure.  Perioperative antibiotics were administered.  Lundberg catheter was placed under sterile conditions.      A midline vertical skin incision was made with the scalpel and carried down to the underlying layer of fascia.  The fascia was incised in the midline and the incision extended superiorly and inferiorly. The rectus muscles were divided in the midline.  The peritoneum was identified, tented up with two tonsil clamps, entered sharply with Metzenbaum scissors. The peritoneal incision was then extended superiorly and inferiorly with good  visualization of bowel and bladder. Omentum was released from the anterior aspect of the uterus. Bookwalter retractor was assembled.  Survey of the abdomen and pelvis revealed the above findings. Bowel was packed away with moist laparotomy sponges.      We began the procedure by identifying the bilateral round ligaments. These were suture ligated and transected. Posterior leaf of the broad ligament was then opened bilaterally facilitating access to the retroperitoneal space. Bilateral uterine-ovarian artery and ligaments were then skeletonized, doublely clamped, transected and doubly suture ligated. We then turned our attention anteriorly.  The anterior leaf of the broad ligament was then opened circumferentially.     The bladder was densely  adherent to the placental tissue protruding through the lower uterine segment. Cystotomy was created at the dome of the bladder, 4cm in length. This allowed for proper identification of the posterior wall of the bladder. We were then able to accurately complete the dissection of the bladder away from the placental bed. This was taken down to the level of the cervicovaginal junction as identified by EEA size placed in the vagina.     We then proceeded with radical dissection of the parametria and identification of ureters bilaterally. Bilateral uterine arteries were identified, noting embolization material within the vessels.  These were clamped, transected, and suture ligated.  The remainder of the   uterosacral and cardinal ligaments were then also serially clamped, transected   and suture ligated. Careful to identify the ureter lateral to the pedicles on both sides.     We then created a posterior vaginal colpotomy and then serially clamped, transected, and suture ligated circumferentially around the cervix. The uterus, placenta, and cervix were then resected in their entirety and handed off to pathology. The vaginal cuff was closed in interrupted figure of eight fashion. Chromic suture was used throughout the case.  Two areas of bleeding were noted at the left angle of vaginal cuff, rendered hemostatic with figure of eight suture.     The bladder was irrigated and cleared of all clots. The cystotomy at the dome of the bladder was closed with a 2-0 vicryl suture in a running fashion. The pelvis was copiously irrigated, cleared of all clot and debris and noted to be hemostatic. Fibrillar was placed within the pelvis along the vaginal cuff and parametrium for added hemostasis.     At this point, the procedure was deemed complete. Laparotomy sponges were removed from the abdomen.  Bookwalter retractor was taken down.  Exparel was placed superiorly and inferiorly to the fascia.  The fascia was then closed with #1  looped PDS in a running fashion.  Incision was copiously irrigated, cleared of all clot and debris and noted to be hemostatic.  Skin incision was closed with 4-0 Monocryl in a subcuticular fashion and topped with a sterile dressing.  The patient tolerated the procedure well.  Sponge, lap, needle and instrument counts were correct x2 as reported by the circulating nurse. She was awakened from anesthesia and taken to recovery in stable condition.

## 2018-02-06 NOTE — TRANSFER OF CARE
"Anesthesia Transfer of Care Note    Patient: Parth Latif    Procedure(s) Performed: Procedure(s) (LRB):  HYSTERECTOMY-ABDOMINAL-TOTAL (HARIS) (N/A)  REPAIR-BLADDER (N/A)    Patient location: PACU    Anesthesia Type: general    Transport from OR: Transported from OR on 6-10 L/min O2 by face mask with adequate spontaneous ventilation    Post pain: adequate analgesia    Post assessment: no apparent anesthetic complications    Post vital signs: stable    Level of consciousness: awake and alert    Nausea/Vomiting: nausea and no vomiting    Complications: none    Transfer of care protocol was followed      Last vitals:   Visit Vitals  /78 (BP Location: Right arm, Patient Position: Lying)   Pulse 95   Temp 36.9 °C (98.5 °F) (Oral)   Resp 16   Ht 5' 2" (1.575 m)   Wt 96.6 kg (213 lb)   SpO2 98%   Breastfeeding? No   BMI 38.96 kg/m²     "

## 2018-02-06 NOTE — PLAN OF CARE
Pt brought to OR at 1208, Dr JERED Bowens delayed for emergency case, Pt brought back to holding area.

## 2018-02-06 NOTE — ASSESSMENT & PLAN NOTE
- admit labs wnl  - s/p uterine artery embolization by IR 2/5   - HARIS/BS today by Ian Bowens and Kendrick  - NPO w/ IVFs   - ppx abx on call to OR. Will likely need vanc/gent/clinda due to PCN allergy and h/o MRSA infection, will d/w staff.

## 2018-02-06 NOTE — SUBJECTIVE & OBJECTIVE
Interval History: Patient reports severe cramping overnight, mostly controlled with norco and dilaudid. States cramps are mild this AM. Mild nausea last night, no vomiting. Able to tolerate dinner. No new complaints this AM. Bleeding minimal.    Scheduled Meds:  Continuous Infusions:   ciprofloxacin (CIPRO)400mg/200ml D5W IVPB 400 mg (02/05/18 1200)    lactated ringers 125 mL/hr at 02/06/18 0026     PRN Meds:albuterol sulfate, ciprofloxacin (CIPRO)400mg/200ml D5W IVPB, diphenhydrAMINE, fentaNYL, heparin (porcine), hydrocodone-acetaminophen 5-325mg, hydrocortisone sodium succinate, HYDROmorphone, HYDROmorphone, midazolam, nitroGLYCERIN, ondansetron, ondansetron, promethazine, sodium chloride 0.9%    Review of patient's allergies indicates:   Allergen Reactions    Ciprofloxacin Hives, Shortness Of Breath, Nausea Only and Rash    Pcn [penicillins] Hives       Objective:     Vital Signs (Most Recent):  Temp: 98.4 °F (36.9 °C) (02/06/18 0404)  Pulse: 93 (02/06/18 0404)  Resp: 20 (02/06/18 0409)  BP: 119/65 (02/06/18 0404)  SpO2: 100 % (02/05/18 2356) Vital Signs (24h Range):  Temp:  [96.4 °F (35.8 °C)-98.4 °F (36.9 °C)] 98.4 °F (36.9 °C)  Pulse:  [] 93  Resp:  [16-24] 20  SpO2:  [90 %-100 %] 100 %  BP: (101-156)/(55-92) 119/65     Weight: 96.6 kg (213 lb)  Body mass index is 38.96 kg/m².  No LMP recorded.    I&O (Last 24H):  Intake/Output - Last 3 Shifts       02/04 0700 - 02/05 0659 02/05 0700 - 02/06 0659    I.V. (mL/kg)  230.2 (2.4)    Total Intake(mL/kg)  230.2 (2.4)    Net   +230.2                Laboratory:  Recent Results (from the past 24 hour(s))   Prepare RBC 2 Units; preop    Collection Time: 02/05/18  9:02 AM   Result Value Ref Range    UNIT NUMBER [id number]     PRODUCT CODE L6099W86     DISPENSE STATUS CROSSMATCHED     CODING SYSTEM [id number]     Unit Blood Type Code 5100     Unit Blood Type O POS     Unit Expiration [id number]     UNIT NUMBER [id number]     PRODUCT CODE R0086G44      DISPENSE STATUS CROSSMATCHED     CODING SYSTEM [id number]     Unit Blood Type Code 5100     Unit Blood Type O POS     Unit Expiration [id number]    CBC auto differential    Collection Time: 02/05/18  9:50 AM   Result Value Ref Range    WBC 11.68 3.90 - 12.70 K/uL    RBC 3.95 (L) 4.00 - 5.40 M/uL    Hemoglobin 11.0 (L) 12.0 - 16.0 g/dL    Hematocrit 34.1 (L) 37.0 - 48.5 %    MCV 86 82 - 98 fL    MCH 27.8 27.0 - 31.0 pg    MCHC 32.3 32.0 - 36.0 g/dL    RDW 14.7 (H) 11.5 - 14.5 %    Platelets 206 150 - 350 K/uL    MPV 9.4 9.2 - 12.9 fL    Gran # (ANC) 8.5 (H) 1.8 - 7.7 K/uL    Lymph # 2.3 1.0 - 4.8 K/uL    Mono # 0.6 0.3 - 1.0 K/uL    Eos # 0.2 0.0 - 0.5 K/uL    Baso # 0.01 0.00 - 0.20 K/uL    Gran% 72.6 38.0 - 73.0 %    Lymph% 19.9 18.0 - 48.0 %    Mono% 5.1 4.0 - 15.0 %    Eosinophil% 2.0 0.0 - 8.0 %    Basophil% 0.1 0.0 - 1.9 %    Differential Method Automated    Protime-INR    Collection Time: 02/05/18  9:50 AM   Result Value Ref Range    Prothrombin Time 10.2 9.0 - 12.5 sec    INR 0.9 0.8 - 1.2   Type & Screen    Collection Time: 02/05/18  9:50 AM   Result Value Ref Range    Group & Rh B POS     Indirect Neema NEG    Fibrinogen    Collection Time: 02/05/18  9:50 AM   Result Value Ref Range    Fibrinogen 392 (H) 182 - 366 mg/dL   Basic metabolic panel    Collection Time: 02/05/18  9:50 AM   Result Value Ref Range    Sodium 137 136 - 145 mmol/L    Potassium 4.4 3.5 - 5.1 mmol/L    Chloride 106 95 - 110 mmol/L    CO2 21 (L) 23 - 29 mmol/L    Glucose 81 70 - 110 mg/dL    BUN, Bld 5 (L) 6 - 20 mg/dL    Creatinine 0.6 0.5 - 1.4 mg/dL    Calcium 9.2 8.7 - 10.5 mg/dL    Anion Gap 10 8 - 16 mmol/L    eGFR if African American >60 >60 mL/min/1.73 m^2    eGFR if non African American >60 >60 mL/min/1.73 m^2      Physical Exam:   Constitutional: She is oriented to person, place, and time. She appears well-developed and well-nourished. No distress.       Cardiovascular: Normal rate and regular rhythm.     Pulmonary/Chest: Effort  normal. No respiratory distress.        Abdominal: Soft. Bowel sounds are normal. She exhibits distension and abdominal incision (vertical midline, well-healed). There is no tenderness. There is no rebound and no guarding.             Musculoskeletal: Moves all extremeties. She exhibits no edema.       Neurological: She is alert and oriented to person, place, and time.    Skin: Skin is warm and dry.    Psychiatric: She has a normal mood and affect. Her behavior is normal.

## 2018-02-06 NOTE — HOSPITAL COURSE
2/5/18 - Patient preadmitted for interval completion hysterectomy. Underwent UAE by IR, has allergic reaction to cipro during procedure. Embolization itself was uncomplicated.  2/6/18 - Underwent HARIS that was complicated by cystotomy -repaired. EBL ~1500 ml, received 1u pRBC intraop. Postop labs stable/normal. 24 Fr Viera in place.   2/7/18 - POD#1. H/H stable. Transitioned to PO meds, ambulated, and advanced to regular diet. Lactation consulted, started pumping.  2/8/18 - POD#2. Pain regimen adjusted with improved benefit. No acute issues. +flatus and BM.  2/9/18 - POD#3. Meeting all milestones and stable for discharge with pain medication, ditropan. Follow up in 1 week for cystogram and viera removal, in 4 weeks for postop visit.

## 2018-02-06 NOTE — PROGRESS NOTES
[facility]  Obstetrics & Gynecology  Progress Note    Patient Name: Parth Latif  MRN: [id number]  Admission Date: 2018  Primary Care Provider: Nils Barnes MD  Principal Problem: Placenta percreta    Subjective:     HPI:  Parth Latif is a 32 y.o. [id number] with placenta previa and accreta, suspected percreta with bladder involvement on imaging, who is s/p classical  18 and presents for pre-admission for interval completion hysterectomy scheduled for 18. She has been doing well from a postop standpoint since the . Reports mild cramping that started over the last 2 days and developed light VB this AM. Describes as a light period using 1 pad over the past 3 hrs, red-brown in appearance. Denies pain, fever, chills. Also reports recent URI for which she in on the last day of a z-pack and using albuterol PRN. Has cough with some chest congestion, otherwise URI symptoms have resolved.    Interval History: Patient reports severe cramping overnight, mostly controlled with norco and dilaudid. States cramps are mild this AM. Mild nausea last night, no vomiting. Able to tolerate dinner. No new complaints this AM. Bleeding minimal.    Scheduled Meds:  Continuous Infusions:   ciprofloxacin (CIPRO)400mg/200ml D5W IVPB 400 mg (18 1200)    lactated ringers 125 mL/hr at 18 0026     PRN Meds:albuterol sulfate, ciprofloxacin (CIPRO)400mg/200ml D5W IVPB, diphenhydrAMINE, fentaNYL, heparin (porcine), hydrocodone-acetaminophen 5-325mg, hydrocortisone sodium succinate, HYDROmorphone, HYDROmorphone, midazolam, nitroGLYCERIN, ondansetron, ondansetron, promethazine, sodium chloride 0.9%    Review of patient's allergies indicates:   Allergen Reactions    Ciprofloxacin Hives, Shortness Of Breath, Nausea Only and Rash    Pcn [penicillins] Hives       Objective:     Vital Signs (Most Recent):  Temp: 98.4 °F (36.9 °C) (18 0404)  Pulse: 93 (18 0404)  Resp:  20 (02/06/18 0409)  BP: 119/65 (02/06/18 0404)  SpO2: 100 % (02/05/18 4526) Vital Signs (24h Range):  Temp:  [96.4 °F (35.8 °C)-98.4 °F (36.9 °C)] 98.4 °F (36.9 °C)  Pulse:  [] 93  Resp:  [16-24] 20  SpO2:  [90 %-100 %] 100 %  BP: (101-156)/(55-92) 119/65     Weight: 96.6 kg (213 lb)  Body mass index is 38.96 kg/m².  No LMP recorded.    I&O (Last 24H):  Intake/Output - Last 3 Shifts       02/04 0700 - 02/05 0659 02/05 0700 - 02/06 0659    I.V. (mL/kg)  230.2 (2.4)    Total Intake(mL/kg)  230.2 (2.4)    Net   +230.2                Laboratory:  Recent Results (from the past 24 hour(s))   Prepare RBC 2 Units; preop    Collection Time: 02/05/18  9:02 AM   Result Value Ref Range    UNIT NUMBER [id number]     PRODUCT CODE [id number]     DISPENSE STATUS CROSSMATCHED     CODING SYSTEM [id number]     Unit Blood Type Code 5100     Unit Blood Type O POS     Unit Expiration [id number]     UNIT NUMBER [id number]     PRODUCT CODE K4707F52     DISPENSE STATUS CROSSMATCHED     CODING SYSTEM HEEI141     Unit Blood Type Code 5100     Unit Blood Type O POS     Unit Expiration [id number]    CBC auto differential    Collection Time: 02/05/18  9:50 AM   Result Value Ref Range    WBC 11.68 3.90 - 12.70 K/uL    RBC 3.95 (L) 4.00 - 5.40 M/uL    Hemoglobin 11.0 (L) 12.0 - 16.0 g/dL    Hematocrit 34.1 (L) 37.0 - 48.5 %    MCV 86 82 - 98 fL    MCH 27.8 27.0 - 31.0 pg    MCHC 32.3 32.0 - 36.0 g/dL    RDW 14.7 (H) 11.5 - 14.5 %    Platelets 206 150 - 350 K/uL    MPV 9.4 9.2 - 12.9 fL    Gran # (ANC) 8.5 (H) 1.8 - 7.7 K/uL    Lymph # 2.3 1.0 - 4.8 K/uL    Mono # 0.6 0.3 - 1.0 K/uL    Eos # 0.2 0.0 - 0.5 K/uL    Baso # 0.01 0.00 - 0.20 K/uL    Gran% 72.6 38.0 - 73.0 %    Lymph% 19.9 18.0 - 48.0 %    Mono% 5.1 4.0 - 15.0 %    Eosinophil% 2.0 0.0 - 8.0 %    Basophil% 0.1 0.0 - 1.9 %    Differential Method Automated    Protime-INR    Collection Time: 02/05/18  9:50 AM   Result Value Ref Range    Prothrombin Time 10.2 9.0 - 12.5 sec    INR  0.9 0.8 - 1.2   Type & Screen    Collection Time: 02/05/18  9:50 AM   Result Value Ref Range    Group & Rh B POS     Indirect Neema NEG    Fibrinogen    Collection Time: 02/05/18  9:50 AM   Result Value Ref Range    Fibrinogen 392 (H) 182 - 366 mg/dL   Basic metabolic panel    Collection Time: 02/05/18  9:50 AM   Result Value Ref Range    Sodium 137 136 - 145 mmol/L    Potassium 4.4 3.5 - 5.1 mmol/L    Chloride 106 95 - 110 mmol/L    CO2 21 (L) 23 - 29 mmol/L    Glucose 81 70 - 110 mg/dL    BUN, Bld 5 (L) 6 - 20 mg/dL    Creatinine 0.6 0.5 - 1.4 mg/dL    Calcium 9.2 8.7 - 10.5 mg/dL    Anion Gap 10 8 - 16 mmol/L    eGFR if African American >60 >60 mL/min/1.73 m^2    eGFR if non African American >60 >60 mL/min/1.73 m^2      Physical Exam:   Constitutional: She is oriented to person, place, and time. She appears well-developed and well-nourished. No distress.       Cardiovascular: Normal rate and regular rhythm.     Pulmonary/Chest: Effort normal. No respiratory distress.        Abdominal: Soft. Bowel sounds are normal. She exhibits distension and abdominal incision (vertical midline, well-healed). There is no tenderness. There is no rebound and no guarding.             Musculoskeletal: Moves all extremeties. She exhibits no edema.       Neurological: She is alert and oriented to person, place, and time.    Skin: Skin is warm and dry.    Psychiatric: She has a normal mood and affect. Her behavior is normal.       Assessment/Plan:     * Placenta percreta    - admit labs wnl  - s/p uterine artery embolization by IR 2/5   - HARIS/BS today by Ian Bowens and Kendrick  - 2u pRBC on hold  - NPO w/ IVFs   - ppx abx on call to OR. Will likely need vanc/gent/clinda due to PCN allergy and h/o MRSA infection, will d/w staff.          URI (upper respiratory infection)    - s/p z-michele  - symptoms resolving  - albuterol nebs PRN            Khadijah Bautista MD  Obstetrics & Gynecology  [facility]

## 2018-02-06 NOTE — PLAN OF CARE
Problem: Patient Care Overview  Goal: Plan of Care Review  Pt still experiencing pain overnight, described it as aching pain on the right side of her hip and back and still experiencing menstrual like cramping in her abdomen. Pain controlled with Dilaudid 1mg Q2 and Vicodin Q4. NO other complaints at this time, scant bleeding reported. Pt ambulating and voiding. NPO started at mn with LR@125ml/hr.

## 2018-02-07 LAB
BASOPHILS # BLD AUTO: 0.01 K/UL
BASOPHILS NFR BLD: 0.1 %
BLD PROD TYP BPU: NORMAL
BLD PROD TYP BPU: NORMAL
BLOOD UNIT EXPIRATION DATE: NORMAL
BLOOD UNIT EXPIRATION DATE: NORMAL
BLOOD UNIT TYPE CODE: 8400
BLOOD UNIT TYPE CODE: 8400
BLOOD UNIT TYPE: NORMAL
BLOOD UNIT TYPE: NORMAL
CODING SYSTEM: NORMAL
CODING SYSTEM: NORMAL
DIFFERENTIAL METHOD: ABNORMAL
DISPENSE STATUS: NORMAL
DISPENSE STATUS: NORMAL
EOSINOPHIL # BLD AUTO: 0.1 K/UL
EOSINOPHIL NFR BLD: 0.7 %
ERYTHROCYTE [DISTWIDTH] IN BLOOD BY AUTOMATED COUNT: 14.9 %
HCT VFR BLD AUTO: 26.6 %
HGB BLD-MCNC: 8.8 G/DL
LYMPHOCYTES # BLD AUTO: 1.9 K/UL
LYMPHOCYTES NFR BLD: 16.9 %
MCH RBC QN AUTO: 28.4 PG
MCHC RBC AUTO-ENTMCNC: 33.1 G/DL
MCV RBC AUTO: 86 FL
MONOCYTES # BLD AUTO: 0.6 K/UL
MONOCYTES NFR BLD: 5.5 %
NEUTROPHILS # BLD AUTO: 8.6 K/UL
NEUTROPHILS NFR BLD: 76.5 %
NUM UNITS TRANS FFP: NORMAL
NUM UNITS TRANS FFP: NORMAL
PLATELET # BLD AUTO: 190 K/UL
PMV BLD AUTO: 9.1 FL
RBC # BLD AUTO: 3.1 M/UL
WBC # BLD AUTO: 11.19 K/UL

## 2018-02-07 PROCEDURE — 25000003 PHARM REV CODE 250: Performed by: STUDENT IN AN ORGANIZED HEALTH CARE EDUCATION/TRAINING PROGRAM

## 2018-02-07 PROCEDURE — 11000001 HC ACUTE MED/SURG PRIVATE ROOM

## 2018-02-07 PROCEDURE — 99024 POSTOP FOLLOW-UP VISIT: CPT | Mod: ,,, | Performed by: OBSTETRICS & GYNECOLOGY

## 2018-02-07 PROCEDURE — 99900035 HC TECH TIME PER 15 MIN (STAT)

## 2018-02-07 PROCEDURE — 85025 COMPLETE CBC W/AUTO DIFF WBC: CPT

## 2018-02-07 PROCEDURE — 63600175 PHARM REV CODE 636 W HCPCS: Performed by: STUDENT IN AN ORGANIZED HEALTH CARE EDUCATION/TRAINING PROGRAM

## 2018-02-07 PROCEDURE — 94799 UNLISTED PULMONARY SVC/PX: CPT

## 2018-02-07 PROCEDURE — 94761 N-INVAS EAR/PLS OXIMETRY MLT: CPT

## 2018-02-07 RX ORDER — MEPERIDINE HYDROCHLORIDE 50 MG/ML
25 INJECTION INTRAMUSCULAR; INTRAVENOUS; SUBCUTANEOUS
Status: DISCONTINUED | OUTPATIENT
Start: 2018-02-07 | End: 2018-02-08

## 2018-02-07 RX ORDER — OXYCODONE AND ACETAMINOPHEN 5; 325 MG/1; MG/1
1 TABLET ORAL EVERY 4 HOURS PRN
Status: DISCONTINUED | OUTPATIENT
Start: 2018-02-07 | End: 2018-02-08

## 2018-02-07 RX ORDER — HYDROMORPHONE HYDROCHLORIDE 2 MG/1
2 TABLET ORAL
Status: DISCONTINUED | OUTPATIENT
Start: 2018-02-07 | End: 2018-02-08

## 2018-02-07 RX ORDER — IBUPROFEN 600 MG/1
600 TABLET ORAL EVERY 6 HOURS
Status: DISCONTINUED | OUTPATIENT
Start: 2018-02-07 | End: 2018-02-09 | Stop reason: HOSPADM

## 2018-02-07 RX ORDER — OXYBUTYNIN CHLORIDE 5 MG/1
5 TABLET ORAL 3 TIMES DAILY
Status: DISCONTINUED | OUTPATIENT
Start: 2018-02-07 | End: 2018-02-09 | Stop reason: HOSPADM

## 2018-02-07 RX ORDER — OXYCODONE AND ACETAMINOPHEN 10; 325 MG/1; MG/1
1 TABLET ORAL EVERY 4 HOURS PRN
Status: DISCONTINUED | OUTPATIENT
Start: 2018-02-07 | End: 2018-02-08

## 2018-02-07 RX ADMIN — OXYCODONE HYDROCHLORIDE AND ACETAMINOPHEN 1 TABLET: 10; 325 TABLET ORAL at 04:02

## 2018-02-07 RX ADMIN — IBUPROFEN 600 MG: 600 TABLET, FILM COATED ORAL at 05:02

## 2018-02-07 RX ADMIN — HYDROMORPHONE HYDROCHLORIDE 1 MG: 1 INJECTION, SOLUTION INTRAMUSCULAR; INTRAVENOUS; SUBCUTANEOUS at 01:02

## 2018-02-07 RX ADMIN — OXYCODONE HYDROCHLORIDE AND ACETAMINOPHEN 1 TABLET: 10; 325 TABLET ORAL at 11:02

## 2018-02-07 RX ADMIN — SIMETHICONE CHEW TAB 80 MG 80 MG: 80 TABLET ORAL at 04:02

## 2018-02-07 RX ADMIN — IBUPROFEN 800 MG: 800 INJECTION INTRAVENOUS at 10:02

## 2018-02-07 RX ADMIN — DOCUSATE SODIUM 200 MG: 100 CAPSULE, LIQUID FILLED ORAL at 10:02

## 2018-02-07 RX ADMIN — MEPERIDINE HYDROCHLORIDE 25 MG: 50 INJECTION INTRAMUSCULAR; INTRAVENOUS; SUBCUTANEOUS at 05:02

## 2018-02-07 RX ADMIN — MUPIROCIN 1 G: 20 OINTMENT TOPICAL at 09:02

## 2018-02-07 RX ADMIN — OXYBUTYNIN CHLORIDE 5 MG: 5 TABLET ORAL at 10:02

## 2018-02-07 RX ADMIN — IBUPROFEN 800 MG: 800 INJECTION INTRAVENOUS at 03:02

## 2018-02-07 RX ADMIN — MUPIROCIN 1 G: 20 OINTMENT TOPICAL at 10:02

## 2018-02-07 RX ADMIN — HYDROMORPHONE HYDROCHLORIDE 1 MG: 1 INJECTION, SOLUTION INTRAMUSCULAR; INTRAVENOUS; SUBCUTANEOUS at 07:02

## 2018-02-07 RX ADMIN — HYDROMORPHONE HYDROCHLORIDE 1 MG: 1 INJECTION, SOLUTION INTRAMUSCULAR; INTRAVENOUS; SUBCUTANEOUS at 10:02

## 2018-02-07 RX ADMIN — OXYBUTYNIN CHLORIDE 5 MG: 5 TABLET ORAL at 05:02

## 2018-02-07 RX ADMIN — DOCUSATE SODIUM 200 MG: 100 CAPSULE, LIQUID FILLED ORAL at 09:02

## 2018-02-07 RX ADMIN — HYDROMORPHONE HYDROCHLORIDE 1 MG: 1 INJECTION, SOLUTION INTRAMUSCULAR; INTRAVENOUS; SUBCUTANEOUS at 03:02

## 2018-02-07 RX ADMIN — HYDROMORPHONE HYDROCHLORIDE 2 MG: 2 TABLET ORAL at 07:02

## 2018-02-07 NOTE — ASSESSMENT & PLAN NOTE
Postoperative care  - Pain control with scheduled IV tylenol and ibuprofen with IV dilaudid for breakthrough pain  - Advance diet to fulls   - Zofran/reglan prn  - Maintain viera catheter due to cystotomy repair. UOP adequate.  - ditropan prn for bladder spasms  - Monitor for return of bowel function. Simethicone prn. Colace BID.  - Encourage ambulation and IS use  - TEDs/SCDs for DVT ppx  - CBC pending this AM as likely had not equilibrated yet on last night's labs

## 2018-02-07 NOTE — LACTATION NOTE
02/07/18 1625   Maternal Infant Assessment   Breast Density soft;Bilateral:   Areola elastic;Bilateral:   Nipple(s) everted;Bilateral:   Pain/Comfort Assessments   Pain Assessment Performed Yes       Number Scale   Presence of Pain denies   Location - Side Bilateral   Location nipple(s)   Pain Rating: Activity 0  (while using breastpump)   Maternal Infant Feeding   Maternal Preparation hand hygiene   Maternal Emotional State independent  (with use of breastpump)   Comfort Measures Before/During Feeding moist heat to breast(s)   Time Spent (min) 15-30 min   Equipment Type/Education   Pump Type Symphony   Breast Pump Type double electric, hospital grade   Breast Pump Flange Type hard   Breast Pump Flange Size 24 mm   Breast Pumping Bilateral Breasts:;massage pre pumping;milk labeled/stored;pumped until emptied   Lactation Referrals   Lactation Consult Knowledge deficit;Pump teaching   Lactation Interventions   Attachment Promotion counseling provided;family involvement promoted;role responsibility promoted   Breastfeeding Assistance electric breast pump used;milk expression/pumping;support offered   Maternal Breastfeeding Support encouragement offered;lactation counseling provided;maternal hydration promoted   Patient expressed desire to provide her baby with breastmilk; praise, support and encouragement provided; answered her questions regarding bringing in a milk supply;  advised on imagery, relaxation and breastpumping techniques to facilitate milk transfer; advised on frequency and duration of use of breastpump, hand express milk for a few minutes after use of electric breastpump; with her permission applied warm compress applied to her breasts and patient massaged her breasts; initiated breastpumping; patient expressed gtts of milk into storage containers;

## 2018-02-07 NOTE — PLAN OF CARE
Problem: Patient Care Overview  Goal: Plan of Care Review  Outcome: Ongoing (interventions implemented as appropriate)  Plan of care reviewed with Pt and family. Pt remains free from falls and injuries. Pt remained in bed throughout shift. TEDS/ SEDS in place. Pt viera intact. Pt abdominal incision C/D/I. Pt c/o pain, scheduled and PRN pain medications administered . Vital signs stable.  at bedside. No distress noted, will continue to monitor.

## 2018-02-07 NOTE — ANESTHESIA POSTPROCEDURE EVALUATION
"Anesthesia Post Evaluation    Patient: Parth Latif    Procedure(s) Performed: Procedure(s) (LRB):  HYSTERECTOMY-ABDOMINAL-TOTAL (HARIS) (N/A)  REPAIR-BLADDER (N/A)    Final Anesthesia Type: general  Patient location during evaluation: NICU  Patient participation: Yes- Able to Participate  Level of consciousness: awake and alert  Post-procedure vital signs: reviewed and stable  Pain management: adequate  Airway patency: patent  PONV status at discharge: No PONV  Anesthetic complications: no      Cardiovascular status: blood pressure returned to baseline and hemodynamically stable  Respiratory status: unassisted, spontaneous ventilation and room air  Hydration status: euvolemic  Follow-up not needed.        Visit Vitals  /62   Pulse 80   Temp 35.9 °C (96.6 °F) (Temporal)   Resp 16   Ht 5' 2" (1.575 m)   Wt 96.6 kg (213 lb)   SpO2 95%   Breastfeeding? No   BMI 38.96 kg/m²       Pain/Alexandrea Score: Pain Assessment Performed: Yes (2/6/2018  5:15 PM)  Presence of Pain: complains of pain/discomfort (2/6/2018  5:47 PM)  Pain Rating Prior to Med Admin: 9 (2/7/2018  1:34 PM)  Pain Rating Post Med Admin: 3 (2/6/2018 11:20 PM)  Alexandrea Score: 9 (2/6/2018  6:05 PM)      "

## 2018-02-07 NOTE — PLAN OF CARE
Problem: Patient Care Overview  Goal: Plan of Care Review  Outcome: Ongoing (interventions implemented as appropriate)  Developed the following lactation plan of care with patient: she will use breastpump on highest comfortable suction 8-10 times in 24 hours for 15 minutes per breast; she will hand express colostrum after using breastpump; label containers with date and time of pumping, name of medicine she is taking; she will wash set up with soap and water after each use; she will call her RN for assistance with use of breastpump;  Assisted patient with use of breastpump;

## 2018-02-07 NOTE — PROGRESS NOTES
[facility]  Obstetrics & Gynecology  Progress Note    Patient Name: Parth Latif  MRN: [id number]  Admission Date: 2018  Primary Care Provider: Nils Barnes MD  Principal Problem: S/P HARIS (total abdominal hysterectomy)    Subjective:     HPI:  Parth Latif is a 32 y.o. 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 She has been doing well from a postop standpoint since the . Reports mild cramping that started over the last 2 days and developed light VB this AM. Describes as a light period using 1 pad over the past 3 hrs, red-brown in appearance. Denies pain, fever, chills. Also reports recent URI for which she in on the last day of a z-pack and using albuterol PRN. Has cough with some chest congestion, otherwise URI symptoms have resolved.    Hospital course:  18 - Patient preadmitted for interval completion hysterectomy. Underwent UAE by IR, has allergic reaction to cipro during procedure. Embolization itself was uncomplicated.  18 - Underwent HARIS that was complicated by cystotomy -repaired. EBL ~1500 ml, received 1u pRBC intraop. Postop labs stable/normal. 24 Fr Lundberg in place.     Interval History: No acute issues overnight. Pain is adequately controlled with PO and IV medications. Dilaudid x2 required since surgery. Tolerating clear liquid diet without nausea or vomiting. Reports she is hungry this AM. Has not ambulated or voided yet. Lundberg catheter in place. Denies issues with bladder spasms. Denies flatus or BM. No other complaints at this time -denies dizziness, lightheadedness, CP, SOB, fever, chills., vaginal bleeding.      Scheduled Meds:   docusate sodium  200 mg Oral BID    ibuprofen  800 mg Intravenous Q8H    mupirocin  1 g Nasal BID     Continuous Infusions:   lactated  ringers 125 mL/hr at 02/06/18 1849     PRN Meds:albuterol sulfate, diphenhydrAMINE, HYDROmorphone, metoclopramide HCl, ondansetron, oxybutynin, simethicone    Review of patient's allergies indicates:   Allergen Reactions    Ciprofloxacin Hives, Shortness Of Breath, Nausea Only and Rash    Pcn [penicillins] Rash     Flushing as a child. Has taken cephalosporins in the past without issue.       Objective:     Vital Signs (Most Recent):  Temp: 97.3 °F (36.3 °C) (02/07/18 0335)  Pulse: 78 (02/07/18 0622)  Resp: 18 (02/07/18 0335)  BP: 108/63 (02/07/18 0336)  SpO2: 95 % (02/07/18 0622) Vital Signs (24h Range):  Temp:  [97.2 °F (36.2 °C)-98.5 °F (36.9 °C)] 97.3 °F (36.3 °C)  Pulse:  [] 78  Resp:  [16-18] 18  SpO2:  [93 %-100 %] 95 %  BP: (107-128)/(63-80) 108/63     Weight: 96.6 kg (213 lb)  Body mass index is 38.96 kg/m².  No LMP recorded.    I&O (Last 24H):  Intake/Output - Last 3 Shifts       02/05 0700 - 02/06 0659 02/06 0700 - 02/07 0659    P.O.  900    I.V. (mL/kg) 926 (9.6) 5245.8 (54.3)    Blood  350    IV Piggyback  350    Total Intake(mL/kg) 926 (9.6) 6845.8 (70.9)    Urine (mL/kg/hr)  2250 (1)    Blood  1500 (0.6)    Total Output   3750    Net +926 +3095.8          Urine Occurrence  1 x        Laboratory:    Recent Labs  Lab 02/05/18  0950 02/06/18 2000   WBC 11.68 16.00*   HGB 11.0* 10.5*   HCT 34.1* 31.9*   MCV 86 86    183      AM CBC pending      Recent Labs  Lab 02/06/18 2000   INR 0.9     Fibrinogen 374    Physical Exam:   Constitutional: She is oriented to person, place, and time. She appears well-developed and well-nourished. No distress.       Cardiovascular: Normal rate and regular rhythm.     Pulmonary/Chest: Effort normal. No respiratory distress.        Abdominal: Soft. Bowel sounds are normal. She exhibits abdominal incision (vertical midline with dressing in place, c/d/i). She exhibits no distension. There is tenderness (mild to moderate, appropriate). There is no rebound and no  guarding.     Genitourinary:   Genitourinary Comments: Viera in place draining clear yellow urine           Musculoskeletal: Moves all extremeties. She exhibits edema (trace pedal edema, symmetric and nontender).   TEDs/SCDs in place       Neurological: She is alert and oriented to person, place, and time.    Skin: Skin is warm and dry.    Psychiatric: She has a normal mood and affect. Her behavior is normal.       Assessment/Plan:     * S/P HARIS/cystotomy repair    Postoperative care  - Pain control with scheduled IV tylenol and ibuprofen with IV dilaudid for breakthrough pain  - Advance diet to fulls   - Zofran/reglan prn  - Maintain viera catheter due to cystotomy repair. UOP adequate.  - ditropan prn for bladder spasms  - Monitor for return of bowel function. Simethicone prn. Colace BID.  - Encourage ambulation and IS use  - TEDs/SCDs for DVT ppx  - CBC pending this AM as likely had not equilibrated yet on last night's labs        URI (upper respiratory infection)    - s/p z-michele  - albuterol nebs PRN        Placenta accreta    - indication for surgery, now resolved              Khadijah Bautista MD  Obstetrics & Gynecology  [facility]

## 2018-02-07 NOTE — PROGRESS NOTES
Pain adequately controlled with IV pain meds. Tolerating full liquid diet and small amount of grits. No nausea or vomiting, reports good appetite. Ambulating without difficulty. Viera in place, no issues. Denies passing flatus or BM yet, but states she feels like she will need to pass gas soon. No new complaints. Reports feeling like her breast milk is coming in, interested in starting to pump.    Temp:  [96.4 °F (35.8 °C)-98.5 °F (36.9 °C)] 96.6 °F (35.9 °C)  Pulse:  [] 80  Resp:  [16-18] 16  SpO2:  [93 %-100 %] 95 %  BP: (107-128)/(62-79) 118/62    GEN: No apparent distress. Alert and oriented.   ABDOMEN: Soft, appropriately tender, mildly distended. Normoactive bowel sounds. No guarding or rebound tenderness.  INCISION: bandage in place, clean/dry/intact  EXT: No erythema, no edema, TEDs/SCDs in place  PELVIC: deferred, viera in place draining clear yellow urine    Intake/Output - Last 3 Shifts       02/05 0700 - 02/06 0659 02/06 0700 - 02/07 0659 02/07 0700 - 02/08 0659    P.O.  900     I.V. (mL/kg) 926 (9.6) 5245.8 (54.3)     Blood  350     IV Piggyback  350     Total Intake(mL/kg) 926 (9.6) 6845.8 (70.9)     Urine (mL/kg/hr)  2250 (1) 1125 (1.2)    Blood  1500 (0.6)     Total Output   3750 1125    Net +926 +3095.8 -1125           Urine Occurrence  1 x         POD#1 s/p HARIS/cystotomy repair  - transition pain regimen to PO meds -- ibuprofen q6, percocet PRN, demerol for BTP  - advance to regular diet  - ditropan changed to scheduled TID this AM  - discussed simethicone PRN for gas pain  - lactation consult to assist with initiation of pumping/breastfeeding  - continue routine postop care, maintain viera throughout hospitalization    D/w Dr. Kwan Bautista MD  OBGYN - PGY 3

## 2018-02-07 NOTE — SUBJECTIVE & OBJECTIVE
Interval History: No acute issues overnight. Pain is adequately controlled with PO and IV medications. Dilaudid x2 required since surgery. Tolerating clear liquid diet without nausea or vomiting. Reports she is hungry this AM. Has not ambulated or voided yet. Lundberg catheter in place. Denies issues with bladder spasms. Denies flatus or BM. No other complaints at this time -denies dizziness, lightheadedness, CP, SOB, fever, chills., vaginal bleeding.      Scheduled Meds:   docusate sodium  200 mg Oral BID    ibuprofen  800 mg Intravenous Q8H    mupirocin  1 g Nasal BID     Continuous Infusions:   lactated ringers 125 mL/hr at 02/06/18 1849     PRN Meds:albuterol sulfate, diphenhydrAMINE, HYDROmorphone, metoclopramide HCl, ondansetron, oxybutynin, simethicone    Review of patient's allergies indicates:   Allergen Reactions    Ciprofloxacin Hives, Shortness Of Breath, Nausea Only and Rash    Pcn [penicillins] Rash     Flushing as a child. Has taken cephalosporins in the past without issue.       Objective:     Vital Signs (Most Recent):  Temp: 97.3 °F (36.3 °C) (02/07/18 0335)  Pulse: 78 (02/07/18 0622)  Resp: 18 (02/07/18 0335)  BP: 108/63 (02/07/18 0336)  SpO2: 95 % (02/07/18 0622) Vital Signs (24h Range):  Temp:  [97.2 °F (36.2 °C)-98.5 °F (36.9 °C)] 97.3 °F (36.3 °C)  Pulse:  [] 78  Resp:  [16-18] 18  SpO2:  [93 %-100 %] 95 %  BP: (107-128)/(63-80) 108/63     Weight: 96.6 kg (213 lb)  Body mass index is 38.96 kg/m².  No LMP recorded.    I&O (Last 24H):  Intake/Output - Last 3 Shifts       02/05 0700 - 02/06 0659 02/06 0700 - 02/07 0659    P.O.  900    I.V. (mL/kg) 926 (9.6) 5245.8 (54.3)    Blood  350    IV Piggyback  350    Total Intake(mL/kg) 926 (9.6) 6845.8 (70.9)    Urine (mL/kg/hr)  2250 (1)    Blood  1500 (0.6)    Total Output   3750    Net +926 +3095.8          Urine Occurrence  1 x        Laboratory:    Recent Labs  Lab 02/05/18  0950 02/06/18 2000   WBC 11.68 16.00*   HGB 11.0* 10.5*   HCT  34.1* 31.9*   MCV 86 86    183      AM CBC pending      Recent Labs  Lab 02/06/18 2000   INR 0.9     Fibrinogen 374    Physical Exam:   Constitutional: She is oriented to person, place, and time. She appears well-developed and well-nourished. No distress.       Cardiovascular: Normal rate and regular rhythm.     Pulmonary/Chest: Effort normal. No respiratory distress.        Abdominal: Soft. Bowel sounds are normal. She exhibits abdominal incision (vertical midline with dressing in place, c/d/i). She exhibits no distension. There is tenderness (mild to moderate, appropriate). There is no rebound and no guarding.     Genitourinary:   Genitourinary Comments: Lundberg in place draining clear yellow urine           Musculoskeletal: Moves all extremeties. She exhibits edema (trace pedal edema, symmetric and nontender).   TEDs/SCDs in place       Neurological: She is alert and oriented to person, place, and time.    Skin: Skin is warm and dry.    Psychiatric: She has a normal mood and affect. Her behavior is normal.

## 2018-02-08 PROBLEM — Z90.710 S/P TAH (TOTAL ABDOMINAL HYSTERECTOMY): Status: ACTIVE | Noted: 2018-02-08

## 2018-02-08 PROBLEM — D62 POSTOPERATIVE ANEMIA DUE TO ACUTE BLOOD LOSS: Status: ACTIVE | Noted: 2018-02-08

## 2018-02-08 LAB
GLUCOSE SERPL-MCNC: 135 MG/DL (ref 70–110)
GLUCOSE SERPL-MCNC: 137 MG/DL (ref 70–110)
GLUCOSE SERPL-MCNC: 85 MG/DL (ref 70–110)
HCO3 UR-SCNC: 19.5 MMOL/L (ref 24–28)
HCO3 UR-SCNC: 20.2 MMOL/L (ref 24–28)
HCO3 UR-SCNC: 21.7 MMOL/L (ref 24–28)
HCT VFR BLD CALC: 24 %PCV (ref 36–54)
HCT VFR BLD CALC: 25 %PCV (ref 36–54)
HCT VFR BLD CALC: 25 %PCV (ref 36–54)
HGB BLD-MCNC: 8 G/DL
HGB BLD-MCNC: 9 G/DL
HGB BLD-MCNC: 9 G/DL
PCO2 BLDA: 28.4 MMHG (ref 35–45)
PCO2 BLDA: 32.6 MMHG (ref 35–45)
PCO2 BLDA: 34.1 MMHG (ref 35–45)
PH SMN: 7.37 [PH] (ref 7.35–7.45)
PH SMN: 7.43 [PH] (ref 7.35–7.45)
PH SMN: 7.46 [PH] (ref 7.35–7.45)
PO2 BLDA: 114 MMHG (ref 80–100)
PO2 BLDA: 119 MMHG (ref 80–100)
PO2 BLDA: 134 MMHG (ref 80–100)
POC BE: -3 MMOL/L
POC BE: -4 MMOL/L
POC BE: -6 MMOL/L
POC IONIZED CALCIUM: 1.05 MMOL/L (ref 1.06–1.42)
POC IONIZED CALCIUM: 1.13 MMOL/L (ref 1.06–1.42)
POC IONIZED CALCIUM: 1.16 MMOL/L (ref 1.06–1.42)
POC SATURATED O2: 99 % (ref 95–100)
POC TCO2: 21 MMOL/L (ref 23–27)
POC TCO2: 21 MMOL/L (ref 23–27)
POC TCO2: 23 MMOL/L (ref 23–27)
POTASSIUM BLD-SCNC: 3.4 MMOL/L (ref 3.5–5.1)
POTASSIUM BLD-SCNC: 3.5 MMOL/L (ref 3.5–5.1)
POTASSIUM BLD-SCNC: 3.5 MMOL/L (ref 3.5–5.1)
SAMPLE: ABNORMAL
SODIUM BLD-SCNC: 135 MMOL/L (ref 136–145)
SODIUM BLD-SCNC: 136 MMOL/L (ref 136–145)
SODIUM BLD-SCNC: 136 MMOL/L (ref 136–145)

## 2018-02-08 PROCEDURE — 25000003 PHARM REV CODE 250: Performed by: STUDENT IN AN ORGANIZED HEALTH CARE EDUCATION/TRAINING PROGRAM

## 2018-02-08 PROCEDURE — 99900035 HC TECH TIME PER 15 MIN (STAT)

## 2018-02-08 PROCEDURE — 94761 N-INVAS EAR/PLS OXIMETRY MLT: CPT

## 2018-02-08 PROCEDURE — 11000001 HC ACUTE MED/SURG PRIVATE ROOM

## 2018-02-08 RX ORDER — HYDROMORPHONE HYDROCHLORIDE 2 MG/1
4 TABLET ORAL EVERY 4 HOURS PRN
Status: DISCONTINUED | OUTPATIENT
Start: 2018-02-08 | End: 2018-02-09 | Stop reason: HOSPADM

## 2018-02-08 RX ORDER — HYDROCODONE BITARTRATE AND ACETAMINOPHEN 10; 325 MG/1; MG/1
1 TABLET ORAL EVERY 4 HOURS PRN
Status: DISCONTINUED | OUTPATIENT
Start: 2018-02-08 | End: 2018-02-08

## 2018-02-08 RX ORDER — HYDROCODONE BITARTRATE AND ACETAMINOPHEN 5; 325 MG/1; MG/1
1 TABLET ORAL EVERY 4 HOURS PRN
Status: DISCONTINUED | OUTPATIENT
Start: 2018-02-08 | End: 2018-02-08

## 2018-02-08 RX ADMIN — OXYBUTYNIN CHLORIDE 5 MG: 5 TABLET ORAL at 08:02

## 2018-02-08 RX ADMIN — SIMETHICONE CHEW TAB 80 MG 80 MG: 80 TABLET ORAL at 09:02

## 2018-02-08 RX ADMIN — HYDROCODONE BITARTRATE AND ACETAMINOPHEN 1 TABLET: 10; 325 TABLET ORAL at 08:02

## 2018-02-08 RX ADMIN — DOCUSATE SODIUM 200 MG: 100 CAPSULE, LIQUID FILLED ORAL at 08:02

## 2018-02-08 RX ADMIN — IBUPROFEN 600 MG: 600 TABLET, FILM COATED ORAL at 06:02

## 2018-02-08 RX ADMIN — OXYBUTYNIN CHLORIDE 5 MG: 5 TABLET ORAL at 12:02

## 2018-02-08 RX ADMIN — HYDROMORPHONE HYDROCHLORIDE 4 MG: 2 TABLET ORAL at 09:02

## 2018-02-08 RX ADMIN — OXYBUTYNIN CHLORIDE 5 MG: 5 TABLET ORAL at 09:02

## 2018-02-08 RX ADMIN — MUPIROCIN 1 G: 20 OINTMENT TOPICAL at 09:02

## 2018-02-08 RX ADMIN — DOCUSATE SODIUM 200 MG: 100 CAPSULE, LIQUID FILLED ORAL at 09:02

## 2018-02-08 RX ADMIN — MUPIROCIN 1 G: 20 OINTMENT TOPICAL at 08:02

## 2018-02-08 RX ADMIN — HYDROMORPHONE HYDROCHLORIDE 4 MG: 2 TABLET ORAL at 06:02

## 2018-02-08 RX ADMIN — IBUPROFEN 600 MG: 600 TABLET, FILM COATED ORAL at 11:02

## 2018-02-08 RX ADMIN — HYDROMORPHONE HYDROCHLORIDE 2 MG: 2 TABLET ORAL at 09:02

## 2018-02-08 RX ADMIN — IBUPROFEN 600 MG: 600 TABLET, FILM COATED ORAL at 12:02

## 2018-02-08 RX ADMIN — HYDROMORPHONE HYDROCHLORIDE 4 MG: 2 TABLET ORAL at 02:02

## 2018-02-08 RX ADMIN — ONDANSETRON 8 MG: 8 TABLET, ORALLY DISINTEGRATING ORAL at 09:02

## 2018-02-08 RX ADMIN — OXYBUTYNIN CHLORIDE 5 MG: 5 TABLET ORAL at 02:02

## 2018-02-08 NOTE — PROGRESS NOTES
[facility]  Obstetrics & Gynecology  Progress Note    Patient Name: Parth Latif  MRN: [id number]  Admission Date: 2/5/2018  Primary Care Provider: Nils Barnes MD  Principal Problem: S/P HARIS (total abdominal hysterectomy)    Subjective:     Hospital course:  2/5/18 - Patient preadmitted for interval completion hysterectomy. Underwent UAE by IR, has allergic reaction to cipro during procedure. Embolization itself was uncomplicated.  2/6/18 - Underwent HARIS that was complicated by cystotomy -repaired. EBL ~1500 ml, received 1u pRBC intraop. Postop labs stable/normal. 24 Fr Viera in place.   2/7/18 - POD#1. H/H stable. Transitioned to PO meds, ambulated, and advanced to regular diet. Lactation consulted, started pumping.    Interval History: POD#2. Had some issues with pain control last night, IV demerol did not work as well for BTP. PO dilaudid given and pain became controlled. Has tried not to take percocet as she finds it too sedating. Otherwise doing well. Tolerating regular diet without N/V. Ambulating without difficulty. Using IS. Passing gas, no BM yet. Tolerating viera well. No issues with pumping or breasts.    Scheduled Meds:   docusate sodium  200 mg Oral BID    ibuprofen  600 mg Oral Q6H    mupirocin  1 g Nasal BID    oxybutynin  5 mg Oral TID     Continuous Infusions:  PRN Meds:albuterol sulfate, diphenhydrAMINE, HYDROmorphone, meperidine, metoclopramide HCl, ondansetron, oxyCODONE-acetaminophen, oxyCODONE-acetaminophen, simethicone    Review of patient's allergies indicates:   Allergen Reactions    Ciprofloxacin Hives, Shortness Of Breath, Nausea Only and Rash    Pcn [penicillins] Rash     Flushing as a child. Has taken cephalosporins in the past without issue.       Objective:     Vital Signs (Most Recent):  Temp: 97.5 °F (36.4 °C) (02/08/18 0355)  Pulse: 81 (02/08/18 0355)  Resp: 16 (02/08/18 0356)  BP: 115/66 (02/08/18 0355)  SpO2: 95 % (02/08/18 0355) Vital Signs (24h  Range):  Temp:  [96.4 °F (35.8 °C)-97.5 °F (36.4 °C)] 97.5 °F (36.4 °C)  Pulse:  [71-85] 81  Resp:  [16-18] 16  SpO2:  [95 %-96 %] 95 %  BP: (105-129)/(58-79) 115/66     Weight: 96.6 kg (213 lb)  Body mass index is 38.96 kg/m².  No LMP recorded.    I&O (Last 24H):    Intake/Output Summary (Last 24 hours) at 02/08/18 0651  Last data filed at 02/08/18 0356   Gross per 24 hour   Intake             4000 ml   Output             2360 ml   Net             1640 ml       Physical Exam:   Constitutional: She is oriented to person, place, and time. She appears well-developed and well-nourished. No distress.       Cardiovascular: Normal rate and regular rhythm.     Pulmonary/Chest: Effort normal. No respiratory distress.        Abdominal: Soft. Bowel sounds are normal. She exhibits abdominal incision (dressing removed, steri strips in place on vertical midline, c/d/i). She exhibits no distension. There is tenderness (mild). There is no rebound and no guarding.     Genitourinary:   Genitourinary Comments: Viera in place draining clear yellow urine           Musculoskeletal: Moves all extremeties. She exhibits edema (1+ pedal edema bilaterally, symmetric and non-tender).   TEDs in place       Neurological: She is alert and oriented to person, place, and time.    Skin: Skin is warm and dry.    Psychiatric: She has a normal mood and affect. Her behavior is normal.       Laboratory:    Recent Labs  Lab 02/05/18  0950 02/06/18 2000 02/07/18  0625   WBC 11.68 16.00* 11.19   HGB 11.0* 10.5* 8.8*   HCT 34.1* 31.9* 26.6*   MCV 86 86 86    183 190        Assessment/Plan:     * S/P HARIS/cystotomy repair     Postoperative care  - Pain control with scheduled ibuprofen q6, change to norco PRN and PO dilaudid for BTP  - regular diet   - Zofran/reglan prn  - Maintain viera catheter due to cystotomy repair. UOP adequate.  - ditropan TID for bladder spasms  - Passing flatus. Simethicone prn. Colace BID.  - Encourage ambulation and IS  use  - TEDs/SCDs for DVT ppx when not ambulating  - H/H stable, discussed PNV with iron  - Lactation following, pumping without issue       URI (upper respiratory infection)     - s/p z-michele  - albuterol nebs PRN  - stable, no issues       Placenta accreta     - indication for surgery, now resolved     Dispo: As patient meets milestones, anticipate discharge home POD#2-3      Khadijah Bautista MD  Obstetrics & Gynecology  [facility]

## 2018-02-08 NOTE — PHYSICIAN QUERY
"PT Name: Parth Latif  MR #: [id number]    Physician Query Form - Hematology Clarification      CDS: Mena Concepcion RN, CCDS         Contact information :[phone]  [email or web address]       This form is a permanent document in the medical record.      Query Date: 2018    By submitting this query, we are merely seeking further clarification of documentation. Please utilize your independent clinical judgment when addressing the question(s) below.    The Medical record contains the following:   Indicators  Supporting Clinical Findings Location in Medical Record    "Anemia" documented     x H & H = H/H 11.0/34.1  H/H 10.5/31.9  H/H 8.8/26.6 Lab 18  Lab 18  Lab 18    BP =                     HR=      "GI bleeding" documented      Acute bleeding (Non GI site)     x Transfusion(s) I unit PRBC  Blood bank results 18   x Treatment: PNV,CALCIUM 72/IRON/FOLIC ACID (PRENATAL VITAMIN) Tab PTA Medications per H&P    x Other:  " 32 y.o. 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 Describes as a light period using 1 pad over the past 3 hrs, red-brown in appearance"    "PROCEDURES PERFORMED: Interval radical hysterectomy, lysis of adhesions, cystotomy repair"  "ESTIMATED BLOOD LOSS:  1500 mL" H&P 18                  Op note 18     Provider, please specify diagnosis or diagnoses associated with above clinical findings.    [x] Acute blood loss anemia expected post-operatively    [  ] Acute blood loss anemia    [  ] Chronic blood loss anemia    [  ] Iron deficiency anemia    [  ] Anemia of chronic disease,please specify chronic disease ________    [  ] Other Hematological Diagnosis (please specify): _________________________________    [  ] Clinically Undetermined       Please document in your progress " notes daily for the duration of treatment, until resolved, and include in your discharge summary.

## 2018-02-08 NOTE — PROGRESS NOTES
"10 mg PO percocet, 25 mg of IV demerol and 600 mg of ibuprofen did not relieve patient's 9/10 abdominal pain. Pt stated "the demerol did not work like the dilaudid." Dr. Vizcaino notified. Patient's abdomen soft, tender around incision site - no changes from earlier assessment. No increase in vaginal bleeding. Vital signs stable, Dr. Vizcaino notified. New order for PO dilaudid to be administered now. RN to reassess pain 30 min following administration.   "

## 2018-02-08 NOTE — PLAN OF CARE
Problem: Patient Care Overview  Goal: Plan of Care Review  Outcome: Ongoing (interventions implemented as appropriate)  VSS. Pt afebrile. Vertical incision c,d,i. No drainage present. Abdominal binder in place. No vaginal bleeding present. Pain controlled with PO pain medication. Pt used her breast pump throughout the night without complication. Lundberg c/d/i; UO WNL. Pt wearing TEDs/SCDs. Plan of care reviewed with patient. Pt verbalized understanding.  at bedside. All questions answered. Will continue to monitor.

## 2018-02-08 NOTE — LACTATION NOTE
02/08/18 1700   Equipment Type/Education   Pump Type Symphony   Breast Pump Type double electric, hospital grade   Breast Pump Flange Type hard   Breast Pump Flange Size 24 mm   Pumping Frequency (times) (used 5 times in 24 hours)   Lactation Interventions   Attachment Promotion counseling provided;role responsibility promoted;skin-to-skin contact encouraged  (baby at bedside)   Breastfeeding Assistance milk expression/pumping;supplemental feeding provided   Maternal Breastfeeding Support diary/feeding log utilized;encouragement offered;infant-mother separation minimized;lactation counseling provided;maternal hydration promoted;maternal nutrition promoted;maternal rest encouraged  (breastpumping log utilized)   Patient with her baby in her lap; she has attempted to breastfeed him then supplements him with formula; encouraged bottlefeeding him skin to skin; discussed SNS;  she has used breastpump for breast stimulation; encouraged hand expression after use of breastpump; revidewed imagery, relaxation and breastpumping techniques to facilitate milk transfer; sanitized breastpump set up;

## 2018-02-08 NOTE — PLAN OF CARE
Problem: Patient Care Overview  Goal: Plan of Care Review  Outcome: Ongoing (interventions implemented as appropriate)  Pt in no distress on Ra, IS done. PRN tx not required. Will continue to monitor.

## 2018-02-08 NOTE — PLAN OF CARE
Problem: Patient Care Overview  Goal: Plan of Care Review  Outcome: Ongoing (interventions implemented as appropriate)  No changes at this   Will continue to monitor.

## 2018-02-09 ENCOUNTER — TELEPHONE (OUTPATIENT)
Dept: GYNECOLOGIC ONCOLOGY | Facility: CLINIC | Age: 33
End: 2018-02-09

## 2018-02-09 VITALS
RESPIRATION RATE: 18 BRPM | HEART RATE: 82 BPM | TEMPERATURE: 97 F | OXYGEN SATURATION: 98 % | DIASTOLIC BLOOD PRESSURE: 70 MMHG | SYSTOLIC BLOOD PRESSURE: 124 MMHG | WEIGHT: 213 LBS | BODY MASS INDEX: 39.2 KG/M2 | HEIGHT: 62 IN

## 2018-02-09 PROBLEM — D62 POSTOPERATIVE ANEMIA DUE TO ACUTE BLOOD LOSS: Status: ACTIVE | Noted: 2018-02-09

## 2018-02-09 LAB
BLD PROD TYP BPU: NORMAL
BLOOD UNIT EXPIRATION DATE: NORMAL
BLOOD UNIT TYPE CODE: 5100
BLOOD UNIT TYPE CODE: 5100
BLOOD UNIT TYPE CODE: 7300
BLOOD UNIT TYPE CODE: 7300
BLOOD UNIT TYPE: NORMAL
CODING SYSTEM: NORMAL
DISPENSE STATUS: NORMAL
TRANS ERYTHROCYTES VOL PATIENT: NORMAL ML

## 2018-02-09 PROCEDURE — 99900035 HC TECH TIME PER 15 MIN (STAT)

## 2018-02-09 PROCEDURE — 25000003 PHARM REV CODE 250: Performed by: STUDENT IN AN ORGANIZED HEALTH CARE EDUCATION/TRAINING PROGRAM

## 2018-02-09 RX ORDER — OXYBUTYNIN CHLORIDE 5 MG/1
5 TABLET ORAL 3 TIMES DAILY
Qty: 30 TABLET | Refills: 0 | Status: SHIPPED | OUTPATIENT
Start: 2018-02-09 | End: 2018-02-09

## 2018-02-09 RX ORDER — IBUPROFEN 600 MG/1
600 TABLET ORAL EVERY 6 HOURS PRN
Qty: 45 TABLET | Refills: 1 | Status: SHIPPED | OUTPATIENT
Start: 2018-02-09 | End: 2018-02-09

## 2018-02-09 RX ORDER — HYDROMORPHONE HYDROCHLORIDE 4 MG/1
4 TABLET ORAL EVERY 4 HOURS PRN
Qty: 40 TABLET | Refills: 0 | Status: SHIPPED | OUTPATIENT
Start: 2018-02-09 | End: 2018-02-20

## 2018-02-09 RX ORDER — IBUPROFEN 600 MG/1
600 TABLET ORAL EVERY 6 HOURS PRN
Qty: 45 TABLET | Refills: 1 | Status: SHIPPED | OUTPATIENT
Start: 2018-02-09 | End: 2018-02-20

## 2018-02-09 RX ORDER — FLUCONAZOLE 150 MG/1
150 TABLET ORAL ONCE
Status: DISCONTINUED | OUTPATIENT
Start: 2018-02-09 | End: 2018-02-09

## 2018-02-09 RX ORDER — OXYBUTYNIN CHLORIDE 5 MG/1
5 TABLET ORAL 3 TIMES DAILY
Qty: 30 TABLET | Refills: 0 | Status: SHIPPED | OUTPATIENT
Start: 2018-02-09 | End: 2018-02-20

## 2018-02-09 RX ADMIN — MUPIROCIN 1 G: 20 OINTMENT TOPICAL at 09:02

## 2018-02-09 RX ADMIN — HYDROMORPHONE HYDROCHLORIDE 4 MG: 2 TABLET ORAL at 10:02

## 2018-02-09 RX ADMIN — HYDROMORPHONE HYDROCHLORIDE 4 MG: 2 TABLET ORAL at 02:02

## 2018-02-09 RX ADMIN — IBUPROFEN 600 MG: 600 TABLET, FILM COATED ORAL at 06:02

## 2018-02-09 RX ADMIN — IBUPROFEN 600 MG: 600 TABLET, FILM COATED ORAL at 12:02

## 2018-02-09 RX ADMIN — HYDROMORPHONE HYDROCHLORIDE 4 MG: 2 TABLET ORAL at 06:02

## 2018-02-09 RX ADMIN — DOCUSATE SODIUM 200 MG: 100 CAPSULE, LIQUID FILLED ORAL at 09:02

## 2018-02-09 RX ADMIN — OXYBUTYNIN CHLORIDE 5 MG: 5 TABLET ORAL at 06:02

## 2018-02-09 NOTE — LACTATION NOTE
Pt states baby is breastfeeding and she is pumping. Pt is encouraged about milk production. Questions answered. Pt has lactation warmline contact number.

## 2018-02-09 NOTE — TELEPHONE ENCOUNTER
----- Message from Sulma Donnelly sent at 2/9/2018  9:42 AM CST -----  Contact: pt  _  1st Request  _  2nd Request  _  3rd Request        Who: pt    Why: Requesting a call back in regards to needs disability filled out for work. Please call pt     What Number to Call Back:[phone]    When to Expect a call back: (Within 24 hours)    Please return the call at earliest convenience. Thanks!

## 2018-02-09 NOTE — PLAN OF CARE
Problem: Patient Care Overview  Goal: Plan of Care Review  Outcome: Outcome(s) achieved Date Met: 02/09/18  Pt doing well throughout shift. VSS. Pain relieved by oral pain medication. I/O maintained. Incision clean and dry, sutures and staples in place. Patient has been using incentive spirometer occasionally throughout shift. Pumping in bed without assistance. Patient sent home with viera. Leg bag and large viera bag given. Patient given discharge instructions and demonstration of they viera. Parth and her  verbalized understanding and demonstrated viera care. To follow up in clinic on 2/14.

## 2018-02-09 NOTE — DISCHARGE SUMMARY
[facility]  Obstetrics & Gynecology  Discharge Summary    Patient Name: Parth Latif  MRN: [id number]  Admission Date: 2018  Hospital Length of Stay: 4 days  Discharge Date and Time:  2018 10:14 AM  Attending Physician: Lamont Bowens MD   Discharging Provider: Khadijah Bautista MD  Primary Care Provider: Nils Barnes MD    HPI:  Parth Latif is a 32 y.o. 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 She has been doing well from a postop standpoint since the . Reports mild cramping that started over the last 2 days and developed light VB this AM. Describes as a light period using 1 pad over the past 3 hrs, red-brown in appearance. Denies pain, fever, chills. Also reports recent URI for which she in on the last day of a z-pack and using albuterol PRN. Has cough with some chest congestion, otherwise URI symptoms have resolved.    Hospital Course:  18 - Patient preadmitted for interval completion hysterectomy. Underwent UAE by IR, has allergic reaction to cipro during procedure. Embolization itself was uncomplicated.  18 - Underwent HARIS that was complicated by cystotomy -repaired. EBL ~1500 ml, received 1u pRBC intraop. Postop labs stable/normal. 24 Fr Viera in place.   18 - POD#1. H/H stable. Transitioned to PO meds, ambulated, and advanced to regular diet. Lactation consulted, started pumping.  18 - POD#2. Pain regimen adjusted with improved benefit. No acute issues. +flatus and BM.  18 - POD#3. Meeting all milestones and stable for discharge with pain medication, ditropan. Follow up in 1 week for cystogram and viera removal, in 4 weeks for postop visit.    Procedure(s) (LRB):  HYSTERECTOMY-ABDOMINAL-TOTAL (HARIS) (N/A)  REPAIR-BLADDER (N/A)     Consults         Status  Ordering Provider     Inpatient consult to Lactation  Once     Provider:  (Not yet assigned)    Acknowledged SAMSON GÓMEZ          Significant Diagnostic Studies:     Recent Labs  Lab 02/05/18  0950  02/06/18  1535 02/06/18 2000 02/07/18  0625   WBC 11.68  --   --  16.00* 11.19   HGB 11.0*  --   --  10.5* 8.8*   HCT 34.1*  < > 24* 31.9* 26.6*   MCV 86  --   --  86 86     --   --  183 190   < > = values in this interval not displayed.       Pending Diagnostic Studies:     None        Final Active Diagnoses:    Diagnosis Date Noted POA    PRINCIPAL PROBLEM:  S/P HARIS/cystotomy repair [Z90.710] 02/08/2018 Yes    Postoperative anemia due to acute blood loss [D62] 02/09/2018 Yes    URI (upper respiratory infection) [J06.9] 02/05/2018 Yes    Placenta accreta [O43.219] 01/04/2018 Yes      Problems Resolved During this Admission:    Diagnosis Date Noted Date Resolved POA    Placental abnormality [O43.109] 02/05/2018 02/06/2018 Yes        Discharged Condition: good    Disposition: Home or Self Care    Follow Up:  Follow-up Information     Lamont Bowens MD. Schedule an appointment as soon as possible for a visit in 4 weeks.    Specialties:  Obstetrics, Gynecology, Gynecologic Oncology  Why:  postoperative visit  Contact information:  [address] [place] [address]  [phone]             Milagros Marks MD On 2/14/2018.    Specialty:  Gynecologic Oncology  Why:  follow up visit for viera catheter removal  Contact information:  [address] [place] [address]  [phone]                 Patient Instructions:     FL Cystogram Retrograde (xpd)   Standing Status: Future  Standing Exp. Date: 02/08/19   Order Specific Question Answer Comments   Reason for Exam: follow up s/p cystotomy repair    Is the patient pregnant? No    May the Radiologist modify the order per protocol to meet the clinical needs of the patient? Yes      Activity as tolerated     Other restrictions (specify):   Order  Comments: No heavy lifting or heavy housework, no exercise more than walking, no intercourse, and nothing in the vagina until your follow up visit.     Notify your health care provider if you experience any of the following:  temperature >100.4     Notify your health care provider if you experience any of the following:  persistent nausea and vomiting or diarrhea     Notify your health care provider if you experience any of the following:  severe uncontrolled pain     Notify your health care provider if you experience any of the following:  redness, tenderness, or signs of infection (pain, swelling, redness, odor or green/yellow discharge around incision site)     Notify your health care provider if you experience any of the following:  difficulty breathing or increased cough     Notify your health care provider if you experience any of the following:  severe persistent headache     Notify your health care provider if you experience any of the following:  persistent dizziness, light-headedness, or visual disturbances     Notify your health care provider if you experience any of the following:   Order Comments: Heavy vaginal bleeding saturating more than 1 pad per hr for at least consecutive 2 hrs.     No dressing needed   Order Comments: Wash incision with soap/water daily, dry completely. Remove steri-strips 1 week from surgery       Medications:  Reconciled Home Medications:   Current Discharge Medication List      START taking these medications    Details   HYDROmorphone (DILAUDID) 4 MG tablet Take 1 tablet (4 mg total) by mouth every 4 (four) hours as needed for Pain.  Qty: 40 tablet, Refills: 0    Associated Diagnoses: S/P HARIS (total abdominal hysterectomy)      ibuprofen (ADVIL,MOTRIN) 600 MG tablet Take 1 tablet (600 mg total) by mouth every 6 (six) hours as needed for Pain.  Qty: 45 tablet, Refills: 1    Associated Diagnoses: S/P HARIS (total abdominal hysterectomy)      oxybutynin (DITROPAN) 5 MG Tab Take 1  tablet (5 mg total) by mouth 3 (three) times daily.  Qty: 30 tablet, Refills: 0         CONTINUE these medications which have NOT CHANGED    Details   PNV,CALCIUM 72/IRON/FOLIC ACID (PRENATAL VITAMIN) Tab Take 1 tablet by mouth once daily.             Khadijah Bautista MD  Obstetrics & Gynecology  [facility]

## 2018-02-09 NOTE — PLAN OF CARE
Problem: Patient Care Overview  Goal: Plan of Care Review  Outcome: Ongoing (interventions implemented as appropriate)  Pt tolerating PO well, no acute distress, ambulating without difficulty, bleeding light, fundus firm, pain well controlled on prescribed meds, bonding well with infant breast feeding. Urine OP WDL. Incision clean and dry with steri stips intact. Breath sounds clear. Will continue to monitor.

## 2018-02-09 NOTE — SUBJECTIVE & OBJECTIVE
Interval History: No acute issues overnight. Pain is adequately controlled with PO medications. Tolerating regular diet without nausea or vomiting. Ambulating without difficulty. Lundberg catheter in place, tolerating ok. Denies issues with bladder spasms. Passing flatus. Reports severe gas pain overnight followed by BM, with subsequent improvement in pain. Also had coughing last night with coughing up of a fair amount of phlegm, improved this AM. No other complaints at this time -denies dizziness, lightheadedness, CP, SOB, fever, chills, vaginal bleeding.    Scheduled Meds:   docusate sodium  200 mg Oral BID    ibuprofen  800 mg Intravenous Q8H    mupirocin  1 g Nasal BID     Continuous Infusions:   lactated ringers 125 mL/hr at 02/06/18 1849     PRN Meds:albuterol sulfate, diphenhydrAMINE, HYDROmorphone, metoclopramide HCl, ondansetron, oxybutynin, simethicone    Review of patient's allergies indicates:   Allergen Reactions    Ciprofloxacin Hives, Shortness Of Breath, Nausea Only and Rash    Pcn [penicillins] Rash     Flushing as a child. Has taken cephalosporins in the past without issue.       Objective:     Vital Signs (Most Recent):  Temp: 97.3 °F (36.3 °C) (02/07/18 0335)  Pulse: 78 (02/07/18 0622)  Resp: 18 (02/07/18 0335)  BP: 108/63 (02/07/18 0336)  SpO2: 95 % (02/07/18 0622) Vital Signs (24h Range):  Temp:  [97.2 °F (36.2 °C)-98.5 °F (36.9 °C)] 97.3 °F (36.3 °C)  Pulse:  [] 78  Resp:  [16-18] 18  SpO2:  [93 %-100 %] 95 %  BP: (107-128)/(63-80) 108/63     Weight: 96.6 kg (213 lb)  Body mass index is 38.96 kg/m².  No LMP recorded.    I&O (Last 24H):  Intake/Output - Last 3 Shifts       02/07 0700 - 02/08 0659 02/08 0700 - 02/09 0659    P.O. 1500 3200    I.V. (mL/kg) 2000 (20.7)     IV Piggyback 500     Total Intake(mL/kg) 4000 (41.4) 3200 (33.1)    Urine (mL/kg/hr) 2360 (1) 2600 (1.1)    Stool  0 (0)    Total Output 2360 2600    Net +1640 +600          Stool Occurrence  1 x         Laboratory:    Recent Labs  Lab 02/05/18  0950  02/06/18  1535 02/06/18 2000 02/07/18  0625   WBC 11.68  --   --  16.00* 11.19   HGB 11.0*  --   --  10.5* 8.8*   HCT 34.1*  < > 24* 31.9* 26.6*   MCV 86  --   --  86 86     --   --  183 190   < > = values in this interval not displayed.       Physical Exam:   Constitutional: She is oriented to person, place, and time. She appears well-developed and well-nourished. No distress.       Cardiovascular: Normal rate and regular rhythm.     Pulmonary/Chest: Effort normal. No respiratory distress.        Abdominal: Soft. Bowel sounds are normal. She exhibits abdominal incision (vertical midline with steri-strips in place, c/d/i ). She exhibits no distension. There is tenderness (mild). There is no rebound and no guarding.     Genitourinary:   Genitourinary Comments: Lundberg in place draining clear yellow urine           Musculoskeletal: Moves all extremeties. She exhibits edema (1-2+ pedal edema, symmetric and nontender).       Neurological: She is alert and oriented to person, place, and time.    Skin: Skin is warm and dry.    Psychiatric: She has a normal mood and affect. Her behavior is normal.

## 2018-02-09 NOTE — PLAN OF CARE
Problem: Patient Care Overview  Goal: Plan of Care Review  Outcome: Ongoing (interventions implemented as appropriate)    Preparation and Hygiene:    1. Shower daily.  2. Wear a clean bra each day and wash daily in warm soapy water.  3. Change wet or moist breast pads frequently.  Moist pads can promote growth of germs.  4. Actively wash your hands, paying close attention to the area around and under your fingernails, thoroughly with soap and water for 15 seconds before pumping or handling your milk.  Re-wash your hands if you touch anything (scratching your nose, answering the phone, etc) while pumping or handling your milk.   5. Before pumping your breasts, assemble the pump collection kit and have ready the sterile container and labels.  Place these items on a clean surface next to the breastpump.  6. Each time after you have finished pumping, take apart all of the parts of the breastpump collection kit and place them in a separate cleaning container (do not place them in the sink).  Be sure to remove the yellow valve from the breastshield and separate the white membrane from the yellow valve.  Rinse all of these parts with cool water.  Then use a new sponge and/or bottle brush and dishwashing detergent to clean the parts.  Rinse off the soapy water with cool water and air dry on a clean towel covered with a clean cloth.  All parts may also be washed after each use in the top rack of a .  7. Once each day, sterilize all of the parts of the breastpump collection kit.  This can be done by boiling the kit parts for 10 minutes or by using a Quick Clean Micro-Steam Bag made by [employer]  8. If condensation appears in the tubing, continue to run the pump with the tubing attached for 1-2 minutes or until the tubing is dry.         Collection and Storage of Expressed Breastmilk:    Pump your breasts at least 8-10 times every 24 hours.  Double pump (both breasts at  the same time) for at least 15-20 minutes  using the most suction that is comfortable.    Please see [employer] handout for details of breastmilk collection and storage.

## 2018-02-09 NOTE — PROGRESS NOTES
[facility]  Obstetrics & Gynecology  Progress Note    Patient Name: Parth Latif  MRN: [id number]  Admission Date: 2018  Primary Care Provider: Nils Barnes MD  Principal Problem: S/P HARIS (total abdominal hysterectomy)    Subjective:     HPI:  Parth Latif is a 32 y.o. 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 She has been doing well from a postop standpoint since the . Reports mild cramping that started over the last 2 days and developed light VB this AM. Describes as a light period using 1 pad over the past 3 hrs, red-brown in appearance. Denies pain, fever, chills. Also reports recent URI for which she in on the last day of a z-pack and using albuterol PRN. Has cough with some chest congestion, otherwise URI symptoms have resolved.    Interval History: No acute issues overnight. Pain is adequately controlled with PO medications. Tolerating regular diet without nausea or vomiting. Ambulating without difficulty. Lundberg catheter in place, tolerating ok. Denies issues with bladder spasms. Passing flatus. Reports severe gas pain overnight followed by BM, with subsequent improvement in pain. Also had coughing last night with coughing up of a fair amount of phlegm, improved this AM. No other complaints at this time -denies dizziness, lightheadedness, CP, SOB, fever, chills, vaginal bleeding.    Scheduled Meds:   docusate sodium  200 mg Oral BID    ibuprofen  800 mg Intravenous Q8H    mupirocin  1 g Nasal BID     Continuous Infusions:   lactated ringers 125 mL/hr at 18 1849     PRN Meds:albuterol sulfate, diphenhydrAMINE, HYDROmorphone, metoclopramide HCl, ondansetron, oxybutynin, simethicone    Review of patient's allergies indicates:   Allergen Reactions    Ciprofloxacin Hives,  Shortness Of Breath, Nausea Only and Rash    Pcn [penicillins] Rash     Flushing as a child. Has taken cephalosporins in the past without issue.       Objective:     Vital Signs (Most Recent):  Temp: 97.3 °F (36.3 °C) (02/07/18 0335)  Pulse: 78 (02/07/18 0622)  Resp: 18 (02/07/18 0335)  BP: 108/63 (02/07/18 0336)  SpO2: 95 % (02/07/18 0622) Vital Signs (24h Range):  Temp:  [97.2 °F (36.2 °C)-98.5 °F (36.9 °C)] 97.3 °F (36.3 °C)  Pulse:  [] 78  Resp:  [16-18] 18  SpO2:  [93 %-100 %] 95 %  BP: (107-128)/(63-80) 108/63     Weight: 96.6 kg (213 lb)  Body mass index is 38.96 kg/m².  No LMP recorded.    I&O (Last 24H):  Intake/Output - Last 3 Shifts       02/07 0700 - 02/08 0659 02/08 0700 - 02/09 0659    P.O. 1500 3200    I.V. (mL/kg) 2000 (20.7)     IV Piggyback 500     Total Intake(mL/kg) 4000 (41.4) 3200 (33.1)    Urine (mL/kg/hr) 2360 (1) 2600 (1.1)    Stool  0 (0)    Total Output 2360 2600    Net +1640 +600          Stool Occurrence  1 x        Laboratory:    Recent Labs  Lab 02/05/18  0950  02/06/18  1535 02/06/18 2000 02/07/18  0625   WBC 11.68  --   --  16.00* 11.19   HGB 11.0*  --   --  10.5* 8.8*   HCT 34.1*  < > 24* 31.9* 26.6*   MCV 86  --   --  86 86     --   --  183 190   < > = values in this interval not displayed.       Physical Exam:   Constitutional: She is oriented to person, place, and time. She appears well-developed and well-nourished. No distress.       Cardiovascular: Normal rate and regular rhythm.     Pulmonary/Chest: Effort normal. No respiratory distress.        Abdominal: Soft. Bowel sounds are normal. She exhibits abdominal incision (vertical midline with steri-strips in place, c/d/i ). She exhibits no distension. There is tenderness (mild). There is no rebound and no guarding.     Genitourinary:   Genitourinary Comments: Lundberg in place draining clear yellow urine           Musculoskeletal: Moves all extremeties. She exhibits edema (1-2+ pedal edema, symmetric and nontender).        Neurological: She is alert and oriented to person, place, and time.    Skin: Skin is warm and dry.    Psychiatric: She has a normal mood and affect. Her behavior is normal.       Assessment/Plan:     * S/P HARIS/cystotomy repair    Postoperative care  - Pain control with scheduled ibuprofen q6, PO dilaudid PRN  - regular diet   - Zofran/reglan prn  - Maintain viera catheter due to cystotomy repair. UOP adequate. Arranging follow up for cystogram and viera removal next week.  - ditropan TID for bladder spasms  - Passing flatus, BM x1. Simethicone prn. Colace BID.  - Encourage ambulation and IS use  - TEDs/SCDs for DVT ppx when not ambulating  - H/H stable, discussed PNV with iron  - Lactation following, pumping without issue  - anticipate discharge home today        URI (upper respiratory infection)    - s/p z-michele  - albuterol nebs PRN        Placenta accreta    - indication for surgery, now resolved              Khadijah Bautista MD  Obstetrics & Gynecology  [facility]

## 2018-02-09 NOTE — PLAN OF CARE
Problem: Patient Care Overview  Goal: Plan of Care Review  Outcome: Ongoing (interventions implemented as appropriate)  Patient on RA. Sats 97%. No PRN Tx needed. Pt. in no distress, will continue to monitor.

## 2018-02-09 NOTE — ASSESSMENT & PLAN NOTE
Postoperative care  - Pain control with scheduled ibuprofen q6, PO dilaudid PRN  - regular diet   - Zofran/reglan prn  - Maintain viera catheter due to cystotomy repair. UOP adequate. Arranging follow up for cystogram and viera removal next week.  - ditropan TID for bladder spasms  - Passing flatus, BM x1. Simethicone prn. Colace BID.  - Encourage ambulation and IS use  - TEDs/SCDs for DVT ppx when not ambulating  - H/H stable, discussed PNV with iron  - Lactation following, pumping without issue  - anticipate discharge home today

## 2018-02-11 ENCOUNTER — NURSE TRIAGE (OUTPATIENT)
Dept: ADMINISTRATIVE | Facility: CLINIC | Age: 33
End: 2018-02-11

## 2018-02-11 NOTE — NURSING
Approximately 40 minutes into procedure, pt's face became flushed and pt reported nausea and sob. Cipro stopped. Dr. Flores ordered Benadryl, Zofran, and Solu Cortef. Meds given. Pt reported relief within 5 minutes of meds. Will closely monitor. .   
Post embolization, pt with c/o nausea and cramping/pain in lower abd. Additional Zofran and Dilaudid 1 mg given prior to transfer to PACU. In addition, Phenergan 25 mg Ivpb ordered if nausea not resolved. Pt transferred to PACU and report given at bedside. Rash on thighs resolving. Family updated and report phoned to floor.   
3

## 2018-02-12 ENCOUNTER — TELEPHONE (OUTPATIENT)
Dept: RADIOLOGY | Facility: HOSPITAL | Age: 33
End: 2018-02-12

## 2018-02-12 NOTE — TELEPHONE ENCOUNTER
"  Reason for Disposition   SEVERE leg swelling (e.g., swelling extends above knee, entire leg is swollen, weeping fluid)    Answer Assessment - Initial Assessment Questions  1. ONSET: "When did the swelling start?" (e.g., minutes, hours, days)      2 days ago  2. LOCATION: "What part of the leg is swollen?"  "Are both legs swollen or just one leg?"  Both   3. SEVERITY: "How bad is the swelling?" (e.g., localized; mild, moderate, severe)   - Localized - small area of swelling localized to one leg   - MILD pedal edema - swelling limited to foot and ankle, pitting edema < 1/4 inch (6 mm) deep, rest and elevation eliminate most or all swelling   - MODERATE edema - swelling of lower leg to knee, pitting edema > 1/4 inch (6 mm) deep, rest and elevation only partially reduce swelling   - SEVERE edema - swelling extends above knee, facial or hand swelling present       severe  4. REDNESS: "Does the swelling look red or infected?"      no  5. PAIN: "Is the swelling painful to touch?" If so, ask: "How painful is it?"   (Scale 1-10; mild, moderate or severe)      no  6. FEVER: "Do you have a fever?" If so, ask: "What is it, how was it measured, and when did it start?"      99.8    7. CAUSE: "What do you think is causing the leg swelling?"      no  8. MEDICAL HISTORY: "Do you have a history of heart failure, kidney disease, liver failure, or cancer?"  no  9. RECURRENT SYMPTOM: "Have you had leg swelling before?" If so, ask: "When was the last time?" "What happened that time?"      no  10. OTHER SYMPTOMS: "Do you have any other symptoms?" (e.g., chest pain, difficulty breathing)        no  11. PREGNANCY: "Is there any chance you are pregnant?" "When was your last menstrual period?"        no    Protocols used: ST LEG SWELLING AND EDEMA-A-    "

## 2018-02-14 ENCOUNTER — CLINICAL SUPPORT (OUTPATIENT)
Dept: GYNECOLOGIC ONCOLOGY | Facility: CLINIC | Age: 33
End: 2018-02-14
Payer: COMMERCIAL

## 2018-02-14 ENCOUNTER — HOSPITAL ENCOUNTER (OUTPATIENT)
Dept: RADIOLOGY | Facility: HOSPITAL | Age: 33
Discharge: HOME | End: 2018-02-14
Payer: COMMERCIAL

## 2018-02-14 DIAGNOSIS — Z98.890 S/P BLADDER REPAIR: ICD-10-CM

## 2018-02-14 PROCEDURE — 51610 INJECTION FOR BLADDER X-RAY: CPT | Mod: 26,,, | Performed by: RADIOLOGY

## 2018-02-14 PROCEDURE — 74450 X-RAY URETHRA/BLADDER: CPT | Mod: 26,,, | Performed by: RADIOLOGY

## 2018-02-14 PROCEDURE — 74450 X-RAY URETHRA/BLADDER: CPT | Mod: TC

## 2018-02-14 PROCEDURE — 25500020 PHARM REV CODE 255: Performed by: STUDENT IN AN ORGANIZED HEALTH CARE EDUCATION/TRAINING PROGRAM

## 2018-02-14 RX ADMIN — IOTHALAMATE MEGLUMINE 250 ML: 172 INJECTION URETERAL at 02:02

## 2018-02-14 NOTE — PROGRESS NOTES
02/14/18 pt in today to have viera removed. No pain noted from pt. Viera in placed void and trial completed . Pt tolerated well. Cath (24fr)  removed tip intact. Pt voided 150 of 200cc.Dr. Marks aware. Pt given precautions. AARON/MA

## 2018-02-21 PROBLEM — T14.8XXA SUPERFICIAL INJURY OF SKIN: Status: ACTIVE | Noted: 2018-02-21

## 2018-03-05 ENCOUNTER — OFFICE VISIT (OUTPATIENT)
Dept: GYNECOLOGIC ONCOLOGY | Facility: CLINIC | Age: 33
End: 2018-03-05
Payer: COMMERCIAL

## 2018-03-05 VITALS
HEART RATE: 86 BPM | DIASTOLIC BLOOD PRESSURE: 70 MMHG | BODY MASS INDEX: 36.21 KG/M2 | SYSTOLIC BLOOD PRESSURE: 130 MMHG | WEIGHT: 198 LBS

## 2018-03-05 DIAGNOSIS — Z90.710 S/P TAH (TOTAL ABDOMINAL HYSTERECTOMY): Primary | ICD-10-CM

## 2018-03-05 PROCEDURE — 99999 PR PBB SHADOW E&M-EST. PATIENT-LVL II: CPT | Mod: PBBFAC,,, | Performed by: OBSTETRICS & GYNECOLOGY

## 2018-03-05 PROCEDURE — 99024 POSTOP FOLLOW-UP VISIT: CPT | Mod: S$GLB,,, | Performed by: OBSTETRICS & GYNECOLOGY

## 2018-03-05 RX ORDER — SILVER SULFADIAZINE 10 G/1000G
CREAM TOPICAL
COMMUNITY
Start: 2018-02-20 | End: 2018-09-11

## 2018-03-05 NOTE — PROGRESS NOTES
Subjective:      Patient ID: Parth Latif is a 33 y.o. female.    Chief Complaint: Post-op Evaluation      HPI     Ms. Latif is a here for post-op check after HARIS/Cystotomy with repair.  She was seen by [facility] post-op and had a normal cystogram and removal of her Lundberg 1 week post-op.  Since then she has done well.        Review of Systems   Constitutional: Negative for chills, diaphoresis, fatigue and fever.   Respiratory: Negative for chest tightness, shortness of breath and wheezing.    Cardiovascular: Negative for chest pain, palpitations and leg swelling.   Gastrointestinal: Negative for abdominal pain, constipation, diarrhea, nausea and vomiting.   Genitourinary: Negative for difficulty urinating, dyspareunia, dysuria, flank pain, frequency, genital sores, hematuria, pelvic pain, urgency, vaginal bleeding, vaginal discharge and vaginal pain.   Skin: Negative for color change.   Neurological: Negative for dizziness, light-headedness and headaches.   Psychiatric/Behavioral: Negative for agitation.       Past Medical History:   Diagnosis Date    MRSA (methicillin resistant staph aureus) culture positive      Past Surgical History:   Procedure Laterality Date     SECTION      x2     HYSTERECTOMY      MANDIBLE SURGERY      TONSILLECTOMY       Family History   Problem Relation Age of Onset    Hypertension Mother     No Known Problems Father      Social History     Social History    Marital status:      Spouse name: N/A    Number of children: N/A    Years of education: N/A     Occupational History    Not on file.     Social History Main Topics    Smoking status: Never Smoker    Smokeless tobacco: Never Used    Alcohol use No    Drug use: No    Sexual activity: Yes     Partners: Male     Birth control/ protection: IUD     Other Topics Concern    Not on file     Social History Narrative    No narrative on file     Current Outpatient Prescriptions   Medication Sig    PNV,CALCIUM  72/IRON/FOLIC ACID (PRENATAL VITAMIN) Tab Take 1 tablet by mouth once daily.    silver sulfADIAZINE 1% (SILVADENE) 1 % cream      No current facility-administered medications for this visit.      Review of patient's allergies indicates:   Allergen Reactions    Pcn [penicillins] Hives       Objective:   Physical Exam:   Constitutional: She is oriented to person, place, and time. She appears well-developed and well-nourished. No distress.    HENT:   Head: Normocephalic and atraumatic.    Eyes: No scleral icterus.    Neck: Normal range of motion. Neck supple.    Cardiovascular: Normal rate and intact distal pulses.  Exam reveals no cyanosis and no edema.     Pulmonary/Chest: Effort normal. No respiratory distress. She exhibits no tenderness.        Abdominal: Soft. She exhibits mass. She exhibits no distension (incision healing well), no fluid wave and no ascites. There is no tenderness. There is no rebound and no guarding. No hernia.         Genitourinary: Rectum normal and vagina normal. Pelvic exam was performed with patient supine. There is no rash, tenderness or lesion on the right labia. There is no rash, tenderness or lesion on the left labia. Uterus is absent. There is an absent adnexa. Right adnexum displays no mass, no tenderness and no fullness. Left adnexum displays no mass, no tenderness and no fullness. No bleeding (cuff healing well) or unspecified prolapse of vaginal walls in the vagina. No vaginal discharge found. Vaginal cuff normal.Labial bartholins normal.Cervix exhibits absence.           Musculoskeletal: Normal range of motion and moves all extremeties.      Lymphadenopathy:     She has no cervical adenopathy.        Right: No inguinal adenopathy present.        Left: No inguinal adenopathy present.    Neurological: She is alert and oriented to person, place, and time.    Skin: Skin is warm and dry. She is not diaphoretic. No cyanosis.    Psychiatric: She has a normal mood and affect.        Assessment:     1. S/P HARIS/cystotomy repair        Plan:       Doing well post-op.  Has nearly recovered completely.  RTC prn.

## 2018-03-13 ENCOUNTER — TELEPHONE (OUTPATIENT)
Dept: GYNECOLOGIC ONCOLOGY | Facility: CLINIC | Age: 33
End: 2018-03-13

## 2018-03-13 NOTE — TELEPHONE ENCOUNTER
----- Message from Annel Hawkins sent at 3/13/2018 12:15 PM CDT -----  Contact: WILDER GODINEZ [[id number]]  x_  1st Request  _  2nd Request  _  3rd Request      Who: WILDER GODINEZ [[id number]]    Why: patient states she turned in disability paperwork and would like to check the status of it. Patient would like a call back to discuss.     What Number to Call Back: [phone]    When to Expect a call back: (Before the end of the day)   -- if call after 3:00 call back will be tomorrow.

## 2018-03-13 NOTE — TELEPHONE ENCOUNTER
03/13/18 advised pt paperwork is pending Dr. Bowens signature. Note left on Dr. Bowens computer to sign forms. TA/MA

## 2018-05-30 ENCOUNTER — TELEPHONE (OUTPATIENT)
Dept: INTERVENTIONAL RADIOLOGY/VASCULAR | Facility: CLINIC | Age: 33
End: 2018-05-30

## 2018-09-17 PROBLEM — G96.00 CEREBROSPINAL FLUID LEAK: Status: ACTIVE | Noted: 2018-09-17

## 2018-12-04 PROBLEM — K43.2 INCISIONAL HERNIA, WITHOUT OBSTRUCTION OR GANGRENE: Status: ACTIVE | Noted: 2018-12-04

## 2019-06-04 NOTE — TELEPHONE ENCOUNTER
Nurse phoned patient regarding her follow up appointment with M, her MRI and her Dr. Bowens appointment.    Nurse clarified with patient that her appointments were booked around her Dr. Bowens appointment at 10:30am. Patient scheduled for MRI at [facility] at 9:00am, then OB Anesthesia is at 10:00am (can happen later if MRI is running behind), Dr. Bowens at 10:30am, and Dr. Eric in [facility] at 3:20pm. Patient aware that she can come sooner to her M appointment following lunch, but that nurse booked her around the [facility] schedule openings.    Patient verbalized understanding of all information received.    What Type Of Note Output Would You Prefer (Optional)?: Standard Output How Severe Is Your Acne?: mild Is This A New Presentation, Or A Follow-Up?: Acne

## 2019-07-01 PROBLEM — L98.499: Status: ACTIVE | Noted: 2019-07-01

## 2019-07-22 PROBLEM — L98.492: Status: ACTIVE | Noted: 2019-07-01

## 2019-08-06 ENCOUNTER — TELEPHONE (OUTPATIENT)
Dept: GYNECOLOGIC ONCOLOGY | Facility: CLINIC | Age: 34
End: 2019-08-06

## 2019-08-06 NOTE — TELEPHONE ENCOUNTER
08/06/19 rec'd pc from pt wanting to know the name and number of a plastic surgeon. Pt given information on Dr. Griggs. AARON/MA

## 2019-08-06 NOTE — TELEPHONE ENCOUNTER
----- Message from Jonna Mays sent at 8/6/2019 12:16 PM CDT -----  Contact: WILDER GODINEZ [[id number]]    Name of Who is Calling: WILDER GODINEZ [[id number]]       What is the request in detail: Patient is requesting a call from staff in regards to a procedure that she had last year with Dr. Bowens and now having some issues .....Please contact to further discuss and advise.     Can the clinic reply by [facility]: Yes     [facility]:  [phone]

## 2019-08-16 ENCOUNTER — TELEPHONE (OUTPATIENT)
Dept: PLASTIC SURGERY | Facility: CLINIC | Age: 34
End: 2019-08-16

## 2019-08-21 ENCOUNTER — OFFICE VISIT (OUTPATIENT)
Dept: PLASTIC SURGERY | Facility: CLINIC | Age: 34
End: 2019-08-21
Payer: COMMERCIAL

## 2019-08-21 VITALS
WEIGHT: 226.31 LBS | DIASTOLIC BLOOD PRESSURE: 90 MMHG | HEART RATE: 107 BPM | BODY MASS INDEX: 41.39 KG/M2 | SYSTOLIC BLOOD PRESSURE: 140 MMHG

## 2019-08-21 DIAGNOSIS — S31.809A WOUND OF BUTTOCK, INITIAL ENCOUNTER: ICD-10-CM

## 2019-08-21 DIAGNOSIS — S31.829D WOUND OF LEFT BUTTOCK, SUBSEQUENT ENCOUNTER: Primary | ICD-10-CM

## 2019-08-21 PROCEDURE — 99999 PR PBB SHADOW E&M-EST. PATIENT-LVL III: CPT | Mod: PBBFAC,,, | Performed by: SURGERY

## 2019-08-21 PROCEDURE — 3008F PR BODY MASS INDEX (BMI) DOCUMENTED: ICD-10-PCS | Mod: CPTII,S$GLB,, | Performed by: SURGERY

## 2019-08-21 PROCEDURE — 99203 OFFICE O/P NEW LOW 30 MIN: CPT | Mod: S$GLB,,, | Performed by: SURGERY

## 2019-08-21 PROCEDURE — 99999 PR PBB SHADOW E&M-EST. PATIENT-LVL III: ICD-10-PCS | Mod: PBBFAC,,, | Performed by: SURGERY

## 2019-08-21 PROCEDURE — 3008F BODY MASS INDEX DOCD: CPT | Mod: CPTII,S$GLB,, | Performed by: SURGERY

## 2019-08-21 PROCEDURE — 99203 PR OFFICE/OUTPT VISIT, NEW, LEVL III, 30-44 MIN: ICD-10-PCS | Mod: S$GLB,,, | Performed by: SURGERY

## 2019-08-21 NOTE — PROGRESS NOTES
History & Physical    SUBJECTIVE:   Chief complaint: buttock wound    History of Present Illness:  34 y.o. female with unfortunate medical history, has had pelvic embolization x2 and hysterectomy of placenta accreta.  After hysterectomy developed a sacral wound in the right buttcoks, initially appeared like a burn but develpoed into a deep wound.  Was been debrided by a wound doctor.  She has had minimal healing  She is seeing us with her mother as a second opinion  Patient is in pain constantly but has always been ambulatory, not related to pressure or immobility  Past Medical History:   Diagnosis Date    Contact lens/glasses fitting     Encounter for blood transfusion     Incisional hernia     MRSA (methicillin resistant staph aureus) culture positive     NEGATIVE CULTURE 2018    PONV (postoperative nausea and vomiting)     Spinal headache     UTI (urinary tract infection)     Wound, open     buttocks       Past Surgical History:   Procedure Laterality Date     SECTION      X 3    DEBRIDEMENT, BUTTOCK Left 2019    Performed by [facility]ario Briceño MD at [facility] OR    DELIVERY- SECTION N/A 2018    [facility] at [facility] L&D    EMBOLIZATION Bilateral 2018    Performed by Collin Flores [facility] at [facility] CATH LAB    EMBOLIZATION Bilateral 2018    Performed by Jonathan Barnett MD at [facility] CATH LAB    EPIDURAL BLOOD PATCH  2018    HYSTERECTOMY      HYSTERECTOMY-ABDOMINAL-TOTAL (HARIS) N/A 2018    [facility] at [facility] OR    MANDIBLE SURGERY      PLACEMENT, BLOOD PATCH, EPIDURAL N/A 2018    [facility] at [facility] OR    REPAIR, HERNIA, INCISIONAL, INCARCERATED, WITHOUT HISTORY OF PRIOR REPAIR N/A 2018    Performed by Nils Whelan MD at [facility] OR    REPAIR-BLADDER N/A 2018    [facility] at [facility] OR    TONSILLECTOMY         Family History   Problem Relation Age of Onset     Hypertension Mother     No Known Problems Father        Social History     Socioeconomic History    Marital status:      Spouse name: Not on file    Number of children: 3    Years of education: Not on file    Highest education level: Not on file   Occupational History    Occupation: Homemaker   Social Needs    Financial resource strain: Not on file    Food insecurity:     Worry: Not on file     Inability: Not on file    Transportation needs:     Medical: Not on file     Non-medical: Not on file   Tobacco Use    Smoking status: Never Smoker    Smokeless tobacco: Never Used   Substance and Sexual Activity    Alcohol use: Not Currently     Comment: rare    Drug use: No    Sexual activity: Yes     Partners: Male     Birth control/protection: See Surgical Hx   Lifestyle    Physical activity:     Days per week: Not on file     Minutes per session: Not on file    Stress: Not on file   Relationships    Social connections:     Talks on phone: Not on file     Gets together: Not on file     Attends Dru[employer] service: Not on file     Active member of club or organization: Not on file     Attends meetings of clubs or organizations: Not on file     Relationship status: Not on file   Other Topics Concern    Not on file   Social History Narrative    Not on file       Current Outpatient Medications   Medication Sig Dispense Refill    HYDROcodone-acetaminophen (NORCO) 5-325 mg per tablet Take 1 tablet by mouth every 4 (four) hours as needed for Pain. 30 tablet 0    HYDROcodone-acetaminophen (NORCO) 5-325 mg per tablet Take 1 tablet by mouth every 4 (four) hours as needed for Pain.      multivitamin capsule Take 1 capsule by mouth every morning.       No current facility-administered medications for this visit.        Review of patient's allergies indicates:   Allergen Reactions    Ciprofloxacin Hives, Shortness Of Breath, Nausea Only and Rash    Pcn [penicillins] Rash     Flushing as a child. Has taken  cephalosporins in the past without issue.         Review of Systems:    Review of systems negative except as pertinent positive and negatives  listed above      OBJECTIVE:     BP (!) 140/90   Pulse 107   Wt 102.7 kg (226 lb 4.8 oz)   LMP 03/13/2015 Comment: IUD placed 2/13/15  BMI 41.39 kg/m²       Physical Exam:  Gen: NAD, Aox3  Neuro: no focal deficits  HEENT: NCAT, neck supple  CV: RRR  Pulm: Breathing non-labored, chest wall movement equal bilaterally  Abdomen: soft, nontender, no guarding  Gu: 10x5cm wound just to the left of the gluteal cleft.  Surrounding area of firmness/induration, likely fat necrosis  Extremity:normal strength, no cyanosis or edema  Psych: normal mood and affect          ASSESSMENT/PLAN:     Buttocks wound    Likely ischemic after embolectomy and hysterectomy  Will need wider excision of devitalized tissue.  Mostly likely will need bilateral V-Y advancement for closure.  Discussed post-operative hospitalization and 30-40% risk of infection of healing issues post op.  Will obtain a CTA to ensure patency of gluteal vessels to base the flaps on    DO Dani Minor Plastic Surgery Fellow     Cell: [phone]

## 2019-08-21 NOTE — LETTER
[facility]  [address]  [facility] [address]  Phone: [phone]  Fax: [phone] August 21, 2019      Romario Briceño MD  [address] [place] [id number]    Patient: Parth Latif   MR Number: [id number]   YOB: 1985   Date of Visit: 8/21/2019     Dear Dr. Romario Briceño:    Thank you for referring Parth Latif to me for evaluation. Below you will find relevant portions of my assessment and plan of care.    Ms. Latif has a buttocks wound.  This is likely ischemic after embolectomy and hysterectomy.  Will need wider excision of devitalized tissue.  Mostly likely will need bilateral V-Y advancement for closure.    Discussed post-operative hospitalization and 30-40% risk of infection of healing issues post op.  Will obtain a CTA to ensure patency of gluteal vessels to base the flaps on.    If you have questions, please do not hesitate to call me. I look forward to following Parth Latif along with you.    Sincerely,    Guy Griggs MD  Section of Plastic Surgery  Department of Surgery  [facility]     CRB/hcr    CC:  Nils Barnes MD

## 2019-09-03 ENCOUNTER — TELEPHONE (OUTPATIENT)
Dept: PLASTIC SURGERY | Facility: CLINIC | Age: 34
End: 2019-09-03

## 2019-09-26 ENCOUNTER — EXTERNAL HOME HEALTH (OUTPATIENT)
Dept: HOME HEALTH SERVICES | Facility: HOSPITAL | Age: 34
End: 2019-09-26

## 2019-10-18 ENCOUNTER — TELEPHONE (OUTPATIENT)
Dept: HOME HEALTH SERVICES | Facility: HOSPITAL | Age: 34
End: 2019-10-18

## 2019-11-21 ENCOUNTER — EXTERNAL HOME HEALTH (OUTPATIENT)
Dept: HOME HEALTH SERVICES | Facility: HOSPITAL | Age: 34
End: 2019-11-21

## 2019-11-26 ENCOUNTER — TELEPHONE (OUTPATIENT)
Dept: HOME HEALTH SERVICES | Facility: HOSPITAL | Age: 34
End: 2019-11-26

## 2020-02-06 ENCOUNTER — EXTERNAL HOME HEALTH (OUTPATIENT)
Dept: HOME HEALTH SERVICES | Facility: HOSPITAL | Age: 35
End: 2020-02-06

## 2020-03-12 ENCOUNTER — EXTERNAL HOME HEALTH (OUTPATIENT)
Dept: HOME HEALTH SERVICES | Facility: HOSPITAL | Age: 35
End: 2020-03-12

## 2021-05-06 ENCOUNTER — PATIENT MESSAGE (OUTPATIENT)
Dept: RESEARCH | Facility: HOSPITAL | Age: 36
End: 2021-05-06

## 2021-05-10 ENCOUNTER — PATIENT MESSAGE (OUTPATIENT)
Dept: RESEARCH | Facility: HOSPITAL | Age: 36
End: 2021-05-10

## 2022-08-17 NOTE — LETTER
July 6, 2017      Leslee Brown MD  [address] [facility] [address]           [facility]  [address] [facility] [address]  Phone: [phone]          Patient: Parth Latif   MR Number: [id number]   YOB: 1985   Date of Visit: 7/6/2017       Dear Dr. Leslee Brown:    Thank you for referring Parth Latif to me for evaluation. Attached you will find relevant portions of my assessment and plan of care.    If you have questions, please do not hesitate to call me. I look forward to following Parth Latif along with you.    Sincerely,    Nathen Londono III, MD    Enclosure  CC:  No Recipients    If you would like to receive this communication electronically, please contact [email or web address] or [phone] to request more information on [facility] access.    For providers and/or their staff who would like to refer a patient to [facility], please contact us through our one-stop-shop provider referral line, [facility], at [phone].    If you feel you have received this communication in error or would no longer like to receive these types of communications, please e-mail [email or web address]          Medicare Preventive Visit Patient Instructions  Thank you for completing your Welcome to Medicare Visit or Medicare Annual Wellness Visit today  Your next wellness visit will be due in one year (8/18/2023)  The screening/preventive services that you may require over the next 5-10 years are detailed below  Some tests may not apply to you based off risk factors and/or age  Screening tests ordered at today's visit but not completed yet may show as past due  Also, please note that scanned in results may not display below  Preventive Screenings:  Service Recommendations Previous Testing/Comments   Colorectal Cancer Screening  * Colonoscopy    * Fecal Occult Blood Test (FOBT)/Fecal Immunochemical Test (FIT)  * Fecal DNA/Cologuard Test  * Flexible Sigmoidoscopy Age: 39-70 years old   Colonoscopy: every 10 years (may be performed more frequently if at higher risk)  OR  FOBT/FIT: every 1 year  OR  Cologuard: every 3 years  OR  Sigmoidoscopy: every 5 years  Screening may be recommended earlier than age 39 if at higher risk for colorectal cancer  Also, an individualized decision between you and your healthcare provider will decide whether screening between the ages of 74-80 would be appropriate  Colonoscopy: 09/18/2017  FOBT/FIT: Not on file  Cologuard: Not on file  Sigmoidoscopy: Not on file    Screening Current     Breast Cancer Screening Age: 36 years old  Frequency: every 1-2 years  Not required if history of left and right mastectomy Mammogram: 12/24/2019        Cervical Cancer Screening Between the ages of 21-29, pap smear recommended once every 3 years  Between the ages of 33-67, can perform pap smear with HPV co-testing every 5 years     Recommendations may differ for women with a history of total hysterectomy, cervical cancer, or abnormal pap smears in past  Pap Smear: Not on file    Screening Not Indicated   Hepatitis C Screening Once for adults born between 1945 and 1965  More frequently in patients at high risk for Hepatitis C Hep C Antibody: 12/21/2017    Screening Current   Diabetes Screening 1-2 times per year if you're at risk for diabetes or have pre-diabetes Fasting glucose: No results in last 5 years (No results in last 5 years)  A1C: 4 8 % of total Hgb (8/23/2021)  Screening Current   Cholesterol Screening Once every 5 years if you don't have a lipid disorder  May order more often based on risk factors  Lipid panel: 08/06/2022    Screening Current     Other Preventive Screenings Covered by Medicare:  Abdominal Aortic Aneurysm (AAA) Screening: covered once if your at risk  You're considered to be at risk if you have a family history of AAA  Lung Cancer Screening: covers low dose CT scan once per year if you meet all of the following conditions: (1) Age 50-69; (2) No signs or symptoms of lung cancer; (3) Current smoker or have quit smoking within the last 15 years; (4) You have a tobacco smoking history of at least 20 pack years (packs per day multiplied by number of years you smoked); (5) You get a written order from a healthcare provider  Glaucoma Screening: covered annually if you're considered high risk: (1) You have diabetes OR (2) Family history of glaucoma OR (3)  aged 48 and older OR (3)  American aged 72 and older  Osteoporosis Screening: covered every 2 years if you meet one of the following conditions: (1) You're estrogen deficient and at risk for osteoporosis based off medical history and other findings; (2) Have a vertebral abnormality; (3) On glucocorticoid therapy for more than 3 months; (4) Have primary hyperparathyroidism; (5) On osteoporosis medications and need to assess response to drug therapy  Last bone density test (DXA Scan): 12/20/2016  HIV Screening: covered annually if you're between the age of 12-76  Also covered annually if you are younger than 13 and older than 72 with risk factors for HIV infection   For pregnant patients, it is covered up to 3 times per pregnancy  Immunizations:  Immunization Recommendations   Influenza Vaccine Annual influenza vaccination during flu season is recommended for all persons aged >= 6 months who do not have contraindications   Pneumococcal Vaccine   * Pneumococcal conjugate vaccine = PCV13 (Prevnar 13), PCV15 (Vaxneuvance), PCV20 (Prevnar 20)  * Pneumococcal polysaccharide vaccine = PPSV23 (Pneumovax) Adults 25-60 years old: 1-3 doses may be recommended based on certain risk factors  Adults 72 years old: 1-2 doses may be recommended based off what pneumonia vaccine you previously received   Hepatitis B Vaccine 3 dose series if at intermediate or high risk (ex: diabetes, end stage renal disease, liver disease)   Tetanus (Td) Vaccine - COST NOT COVERED BY MEDICARE PART B Following completion of primary series, a booster dose should be given every 10 years to maintain immunity against tetanus  Td may also be given as tetanus wound prophylaxis  Tdap Vaccine - COST NOT COVERED BY MEDICARE PART B Recommended at least once for all adults  For pregnant patients, recommended with each pregnancy  Shingles Vaccine (Shingrix) - COST NOT COVERED BY MEDICARE PART B  2 shot series recommended in those aged 48 and above     Health Maintenance Due:      Topic Date Due    Colorectal Cancer Screening  09/18/2027    Hepatitis C Screening  Completed     Immunizations Due:      Topic Date Due    COVID-19 Vaccine (4 - Booster for Moderna series) 04/27/2022    Pneumococcal Vaccine: 65+ Years (2 - PCV) 07/29/2022    Influenza Vaccine (1) 09/01/2022     Advance Directives   What are advance directives? Advance directives are legal documents that state your wishes and plans for medical care  These plans are made ahead of time in case you lose your ability to make decisions for yourself  Advance directives can apply to any medical decision, such as the treatments you want, and if you want to donate organs  What are the types of advance directives? There are many types of advance directives, and each state has rules about how to use them  You may choose a combination of any of the following:  Living will: This is a written record of the treatment you want  You can also choose which treatments you do not want, which to limit, and which to stop at a certain time  This includes surgery, medicine, IV fluid, and tube feedings  Durable power of  for healthcare [facility]): This is a written record that states who you want to make healthcare choices for you when you are unable to make them for yourself  This person, called a proxy, is usually a family member or a friend  You may choose more than 1 proxy  Do not resuscitate (DNR) order:  A DNR order is used in case your heart stops beating or you stop breathing  It is a request not to have certain forms of treatment, such as CPR  A DNR order may be included in other types of advance directives  Medical directive: This covers the care that you want if you are in a coma, near death, or unable to make decisions for yourself  You can list the treatments you want for each condition  Treatment may include pain medicine, surgery, blood transfusions, dialysis, IV or tube feedings, and a ventilator (breathing machine)  Values history: This document has questions about your views, beliefs, and how you feel and think about life  This information can help others choose the care that you would choose  Why are advance directives important? An advance directive helps you control your care  Although spoken wishes may be used, it is better to have your wishes written down  Spoken wishes can be misunderstood, or not followed  Treatments may be given even if you do not want them  An advance directive may make it easier for your family to make difficult choices about your care     Weight Management   Why it is important to manage your weight:  Being overweight increases your risk of health conditions such as heart disease, high blood pressure, type 2 diabetes, and certain types of cancer  It can also increase your risk for osteoarthritis, sleep apnea, and other respiratory problems  Aim for a slow, steady weight loss  Even a small amount of weight loss can lower your risk of health problems  How to lose weight safely:  A safe and healthy way to lose weight is to eat fewer calories and get regular exercise  You can lose up about 1 pound a week by decreasing the number of calories you eat by 500 calories each day  Healthy meal plan for weight management:  A healthy meal plan includes a variety of foods, contains fewer calories, and helps you stay healthy  A healthy meal plan includes the following:  Eat whole-grain foods more often  A healthy meal plan should contain fiber  Fiber is the part of grains, fruits, and vegetables that is not broken down by your body  Whole-grain foods are healthy and provide extra fiber in your diet  Some examples of whole-grain foods are whole-wheat breads and pastas, oatmeal, brown rice, and bulgur  Eat a variety of vegetables every day  Include dark, leafy greens such as spinach, kale, candice greens, and mustard greens  Eat yellow and orange vegetables such as carrots, sweet potatoes, and winter squash  Eat a variety of fruits every day  Choose fresh or canned fruit (canned in its own juice or light syrup) instead of juice  Fruit juice has very little or no fiber  Eat low-fat dairy foods  Drink fat-free (skim) milk or 1% milk  Eat fat-free yogurt and low-fat cottage cheese  Try low-fat cheeses such as mozzarella and other reduced-fat cheeses  Choose meat and other protein foods that are low in fat  Choose beans or other legumes such as split peas or lentils  Choose fish, skinless poultry (chicken or turkey), or lean cuts of red meat (beef or pork)  Before you cook meat or poultry, cut off any visible fat  Use less fat and oil  Try baking foods instead of frying them   Add less fat, such as margarine, sour cream, regular salad dressing and mayonnaise to foods  Eat fewer high-fat foods  Some examples of high-fat foods include french fries, doughnuts, ice cream, and cakes  Eat fewer sweets  Limit foods and drinks that are high in sugar  This includes candy, cookies, regular soda, and sweetened drinks  Exercise:  Exercise at least 30 minutes per day on most days of the week  Some examples of exercise include walking, biking, dancing, and swimming  You can also fit in more physical activity by taking the stairs instead of the elevator or parking farther away from stores  Ask your healthcare provider about the best exercise plan for you  © Copyright [employer] 2018 Information is for End User's use only and may not be sold, redistributed or otherwise used for commercial purposes  All illustrations and images included in CareNotes® are the copyrighted property of A D A M , Inc  or [address] [place] [address] (By injection)   Semaglutide (oea-l-DSHY-tide)  Treats type 2 diabetes  Lowers the risk of heart attack, stroke, or death in patients with type 2 diabetes and heart or blood vessel disease  Also used to help lose weight and keep the weight off in patients with obesity caused by certain conditions  Brand Name(s): Ozempic 0 25 MG or 0 5 MG Doses, Ozempic 1 MG Doses, Wegovy   There may be other brand names for this medicine  When This Medicine Should Not Be Used: This medicine is not right for everyone  Do not use it if you had an allergic reaction to semaglutide, or if you have multiple endocrine neoplasia syndrome type 2 (MEN 2) or if you or anyone in your family has had medullary thyroid cancer  How to Use This Medicine:   Injectable  Your doctor will prescribe your exact dose and tell you how often it should be given  This medicine is given as a shot under your skin  It is given into your stomach, thigh, or upper arm  You may be taught how to give your medicine at home   Make sure you understand all instructions before giving yourself an injection  Do not use more medicine or use it more often than your doctor tells you to  If you use insulin in addition to this medicine, do not mix them into the same syringe  You may give the shots in the same area (including your stomach), but do not give the shots right next to each other  Check the liquid in the pen  It should be clear and colorless  Do not use it if it is cloudy, discolored, or has particles in it  You will be shown the body areas where this shot can be given  Use a different body area each time you give yourself a shot  Keep track of where you give each shot to make sure you rotate body areas  Use a new needle and syringe each time you inject your medicine  Never share medicine pens with others under any circumstances  Sharing needles or pens can result in transmission of infection  This medicine should come with a Medication Guide  Ask your pharmacist for a copy if you do not have one  Missed dose:   Ozempic®: If you miss a dose of this medicine, use it as soon as possible within 5 days after the missed dose  If you miss a dose for more than 5 days, skip the missed dose and go back to your regular dosing schedule  Wegovy: If you miss a dose, and the next scheduled dose is more than 2 days away, use it as soon as possible  If you miss a dos, and the next scheduled dose is less than 2 days away, skip the missed dose and go back to your regular dosing schedule  If you miss a dose of this medicine for more than 2 weeks, use it on the next scheduled dose  Ask your doctor about how to restart your treatment  Store your new, unused medicine pen in its original carton in the refrigerator  Do not freeze  You may store the opened Ozempic® pen in the refrigerator or at room temperature for 56 days or the opened [facility] pen in the refrigerator or at room temperature for 28 days   Throw away the pen after you use it for 56 days for Ozempic® or 28 days for [facility], even if it still has medicine in it  Drugs and Foods to Avoid:   Ask your doctor or pharmacist before using any other medicine, including over-the-counter medicines, vitamins, and herbal products  Some medicines may affect how semaglutide works  Tell your doctor if you are using other diabetes medicine (including glimepiride, glipizide, glyburide, or insulin) or any oral medicine  Warnings While Using This Medicine:   Tell your doctor if you are pregnant or planning to become pregnant  Do not use this medicine for at least 2 months before you plan to become pregnant  Tell your doctor if you are breastfeeding, or if you have kidney disease, pancreas problems, diabetes, digestion problems, or a history of diabetic retinopathy or depression  This medicine may cause the following problems:  Increased risk of thyroid tumor  Pancreatitis (swelling of the pancreas)  Gallbladder problems  Low blood sugar (when used with other diabetes medicine)  Kidney problems  Eye or vision problems, including diabetic retinopathy  Increased heart rate  Increased risk for depression or thoughts of suicide  Your doctor will do lab tests at regular visits to check on the effects of this medicine  Keep all appointments  Keep all medicine out of the reach of children  Never share your medicine with anyone  Possible Side Effects While Using This Medicine:   Call your doctor right away if you notice any of these side effects:   Allergic reaction: Itching or hives, swelling in your face or hands, swelling or tingling in your mouth or throat, chest tightness, trouble breathing  Blurred vision or any other change in vision  Change in how much or how often you urinate, lower back or side pain, blood in your urine  Shaking, trembling, sweating, fast or pounding heartbeat, lightheadedness, hunger, confusion  Sudden and severe stomach pain, nausea, vomiting, fever, yellow eyes or skin  Unusual moods or behaviors, depression, thoughts of hurting yourself or others  If you notice these less serious side effects, talk with your doctor:   Constipation, diarrhea, passing gas, stomach upset or bloating  Headache, dizziness  Redness, itching, bump, swelling, or any changes in your skin where the shot was given  Tiredness  If you notice other side effects that you think are caused by this medicine, tell your doctor  Call your doctor for medical advice about side effects  You may report side effects to FDA at 7-349-XNI-5521    © Copyright [employer] 2022 Information is for End User's use only and may not be sold, redistributed or otherwise used for commercial purposes  The above information is an  only  It is not intended as medical advice for individual conditions or treatments  Talk to your doctor, nurse or pharmacist before following any medical regimen to see if it is safe and effective for you

## (undated) DEVICE — SOL 9P NACL IRR PIC IL

## (undated) DEVICE — SOL PVP-I SCRUB 7.5% 4OZ

## (undated) DEVICE — ELECTRODE REM PLYHSV RETURN 9

## (undated) DEVICE — SUT PDS II 96IN XLH TAPER

## (undated) DEVICE — SOL BETADINE 5%

## (undated) DEVICE — JELLY KY LUBRICATING 5G PACKET

## (undated) DEVICE — SEE MEDLINE ITEM 153151

## (undated) DEVICE — TRAY FOLEY 16FR INFECTION CONT

## (undated) DEVICE — SPONGE LAP 18X18 PREWASHED

## (undated) DEVICE — LEGGING CLEAR POLY 2/PACK

## (undated) DEVICE — SEE MEDLINE ITEM 152622

## (undated) DEVICE — KIT URINE METER 350ML STAT LOC

## (undated) DEVICE — ELECTRODE BLADE TEFLON 6

## (undated) DEVICE — SOL WATER STRL IRR 1000ML

## (undated) DEVICE — GLOVE BIOGEL SKINSENSE PI 6.5

## (undated) DEVICE — GLOVE BIOGEL SKINSENSE PI 7.5

## (undated) DEVICE — GLOVE PROTEXIS NEOPRENE 7.5

## (undated) DEVICE — STAPLER SKIN ROTATING HEAD

## (undated) DEVICE — WARMER DRAPE STERILE LF

## (undated) DEVICE — SUT 1 27IN CHROMIC GUT CT-1

## (undated) DEVICE — SUT 1 36IN CHROMIC GUT CTX

## (undated) DEVICE — SEE MEDLINE ITEM 157148

## (undated) DEVICE — GLOVE BIOGEL SKINSENSE PI 8.0

## (undated) DEVICE — UNDERGLOVES BIOGEL PI SIZE 8.5

## (undated) DEVICE — DRESSING GZ XRAY 12PLY 4X8 STR

## (undated) DEVICE — SUT CHROME GUT 1 CT-2 27

## (undated) DEVICE — Device

## (undated) DEVICE — SEE MEDLINE ITEM 156931

## (undated) DEVICE — TRAY DRY SKIN SCRUB PREP

## (undated) DEVICE — PAD PREP 50/CA

## (undated) DEVICE — SUT 1 48IN PDS II VIO MONO

## (undated) DEVICE — APPLICATOR CHLORAPREP ORN 26ML

## (undated) DEVICE — SOL IRR NACL .9% 3000ML

## (undated) DEVICE — SEE MEDLINE ITEM 157110

## (undated) DEVICE — CLOSURE SKIN STERI STRIP 1/2X4

## (undated) DEVICE — SEE MEDLINE ITEM 157150

## (undated) DEVICE — FIBRILLAR ABS HEMOSTAT 4X4

## (undated) DEVICE — DRAPE UNDER BUTTOCKS WITH POUCH

## (undated) DEVICE — SEE MEDLINE ITEM 157117

## (undated) DEVICE — GLOVE BIOGEL SKINSENSE PI 8.5

## (undated) DEVICE — DRESSING ADH ISLAND 3.6 X 14